# Patient Record
Sex: MALE | Race: WHITE | Employment: FULL TIME | URBAN - METROPOLITAN AREA
[De-identification: names, ages, dates, MRNs, and addresses within clinical notes are randomized per-mention and may not be internally consistent; named-entity substitution may affect disease eponyms.]

---

## 2017-01-13 ENCOUNTER — OFFICE VISIT (OUTPATIENT)
Dept: FAMILY MEDICINE CLINIC | Age: 52
End: 2017-01-13

## 2017-01-13 VITALS
RESPIRATION RATE: 18 BRPM | DIASTOLIC BLOOD PRESSURE: 81 MMHG | HEIGHT: 73 IN | WEIGHT: 300 LBS | BODY MASS INDEX: 39.76 KG/M2 | OXYGEN SATURATION: 96 % | HEART RATE: 88 BPM | SYSTOLIC BLOOD PRESSURE: 122 MMHG | TEMPERATURE: 98.2 F

## 2017-01-13 DIAGNOSIS — M50.90 CERVICAL DISC DISORDER: Primary | ICD-10-CM

## 2017-01-13 DIAGNOSIS — I10 ESSENTIAL HYPERTENSION WITH GOAL BLOOD PRESSURE LESS THAN 140/90: ICD-10-CM

## 2017-01-13 RX ORDER — METHOCARBAMOL 500 MG/1
500 TABLET, FILM COATED ORAL 3 TIMES DAILY
Qty: 90 TAB | Refills: 1 | Status: SHIPPED | OUTPATIENT
Start: 2017-01-13 | End: 2017-07-28

## 2017-01-13 RX ORDER — METHYLPREDNISOLONE 4 MG/1
TABLET ORAL
Qty: 1 DOSE PACK | Refills: 0 | Status: SHIPPED | OUTPATIENT
Start: 2017-01-13 | End: 2017-07-28

## 2017-01-13 RX ORDER — ATENOLOL 50 MG/1
TABLET ORAL
Qty: 90 TAB | Refills: 3 | Status: SHIPPED | OUTPATIENT
Start: 2017-01-13 | End: 2017-08-04 | Stop reason: SINTOL

## 2017-01-13 NOTE — MR AVS SNAPSHOT
Visit Information Date & Time Provider Department Dept. Phone Encounter #  
 1/13/2017  2:15 PM Cherise Martinez, [address] [facility] [phone] [id number] Upcoming Health Maintenance Date Due Pneumococcal 19-64 Highest Risk (1 of 3 - PCV13) 12/27/1983 DTaP/Tdap/Td series (1 - Tdap) 12/27/1985 COLONOSCOPY 2/23/2015 Allergies as of 1/13/2017  Review Complete On: 1/13/2017 By: Cherise Martinez MD  
 No Known Allergies Current Immunizations  Never Reviewed No immunizations on file. Not reviewed this visit You Were Diagnosed With   
  
 Codes Comments Low back strain, initial encounter     ICD-10-CM: S39.012A ICD-9-CM: 847.2 Encounter to establish care     ICD-10-CM: Z76.89 
ICD-9-CM: V65.8 Essential hypertension with goal blood pressure less than 140/90     ICD-10-CM: I10 
ICD-9-CM: 401.9 Vitals BP Pulse Temp Resp Height(growth percentile) Weight(growth percentile) 122/81 (BP 1 Location: Left arm, BP Patient Position: Sitting) 88 98.2 °F (36.8 °C) 18 6' 1\" (1.854 m) 300 lb (136.1 kg) SpO2 BMI Smoking Status 96% 39.58 kg/m2 Former Smoker BMI and BSA Data Body Mass Index Body Surface Area  
 39.58 kg/m 2 2.65 m 2 Preferred Pharmacy Pharmacy Name Phone [place] [phone] - [address], [address] AT [place] [address] [phone] Your Updated Medication List  
  
   
This list is accurate as of: 1/13/17  2:33 PM.  Always use your most recent med list.  
  
  
  
  
 allopurinol 300 mg tablet Commonly known as:  Jenn Bibber Take 1 Tab by mouth daily. Indications: GOUT  
  
 atenolol 50 mg tablet Commonly known as:  TENORMIN  
TAKE 1 TABLET BY MOUTH EVERY DAY  
  
 citalopram 40 mg tablet Commonly known as:  Kristie Sol Take 1 Tab by mouth daily. gabapentin 100 mg capsule Commonly known as:  NEURONTIN  
 TAKE 3 CAPSULES BY MOUTH THREE TIMES DAILY  
  
 ibuprofen 800 mg tablet Commonly known as:  MOTRIN Take 1 Tab by mouth every eight (8) hours as needed for Pain. lisinopril 20 mg tablet Commonly known as:  Kenn Bridges Take 1 Tab by mouth daily. methocarbamol 500 mg tablet Commonly known as:  ROBAXIN Take 1 Tab by mouth three (3) times daily. methylPREDNISolone 4 mg tablet Commonly known as:  Barbarann Punt As directed  
  
 omeprazole 20 mg capsule Commonly known as:  PRILOSEC Take 20 mg by mouth every morning. PROBIOTIC 4X 10-15 mg Tbec Generic drug:  B.infantis-B.ani-B.long-B.bifi Take 1 Tab by mouth daily. Prescriptions Sent to Pharmacy Refills  
 methocarbamol (ROBAXIN) 500 mg tablet 1 Sig: Take 1 Tab by mouth three (3) times daily. Class: Normal  
 Pharmacy: [facility] [address] Ph #: [phone] Route: Oral  
 methylPREDNISolone (MEDROL DOSEPACK) 4 mg tablet 0 Sig: As directed Class: Normal  
 Pharmacy: [facility] [address], [address] [place] AT [address] [place] [address] Ph #: [phone]  
 atenolol (TENORMIN) 50 mg tablet 3 Sig: TAKE 1 TABLET BY MOUTH EVERY DAY Class: Normal  
 Pharmacy: [facility] [address] Ph #: [phone] Patient Instructions Call Dr. Bella Camacho for follow up appointment to address your persistant neck pain Introducing [facility]! Dear Alexandria Xavier: 
Thank you for requesting a [employer] account. Our records indicate that you have previously registered for a [employer] account but its currently inactive. Please call our [employer] support line at [phone]. Additional Information If you have questions, please visit the Frequently Asked Questions section of the [employer] website at [email or web address] Remember, [employer] is NOT to be used for urgent needs. For medical emergencies, dial 911. Now available from your iPhone and Android! Please provide this summary of care documentation to your next provider. Your primary care clinician is listed as Spike Devine. If you have any questions after today's visit, please call [phone].

## 2017-01-13 NOTE — PROGRESS NOTES
52yo poor historian complains of fatigue, neck pain, pain in both arms and pain in right side. Wakes up feeling ok, gets progressively worse as day goes on especially if he exerts himself. No symptoms below the waist.  Has pain in neck, upper and middle back. Pain radiating to arms is all the time. 9/15 - cervical spine surgery with Dr. Margarita Beltran - had a couple of follow ups when he was recovering. Has not seen him since his symptoms recurred. 12/15 - knee surgery - Dr. Luz Saini Prescriptions on File Prior to Visit   Medication Sig Dispense Refill    methocarbamol (ROBAXIN) 500 mg tablet Take 1 Tab by mouth three (3) times daily. 90 Tab 1    gabapentin (NEURONTIN) 100 mg capsule TAKE 3 CAPSULES BY MOUTH THREE TIMES DAILY 270 Cap 0    ibuprofen (MOTRIN) 800 mg tablet Take 1 Tab by mouth every eight (8) hours as needed for Pain. 30 Tab 0    citalopram (CELEXA) 40 mg tablet Take 1 Tab by mouth daily. 90 Tab 3    lisinopril (PRINIVIL, ZESTRIL) 20 mg tablet Take 1 Tab by mouth daily. 90 Tab 3    allopurinol (ZYLOPRIM) 300 mg tablet Take 1 Tab by mouth daily. Indications: GOUT 90 Tab 3    atenolol (TENORMIN) 50 mg tablet TAKE 1 TABLET BY MOUTH EVERY DAY 90 Tab 3    B.infantis-B.ani-B.long-B.bifi (PROBIOTIC 4X) 10-15 mg TbEC Take 1 Tab by mouth daily.  omeprazole (PRILOSEC) 20 mg capsule Take 20 mg by mouth every morning. No current facility-administered medications on file prior to visit. No Known Allergies     ROS negative except as per HPI. Visit Vitals    /81 (BP 1 Location: Left arm, BP Patient Position: Sitting)    Pulse 88    Temp 98.2 °F (36.8 °C)    Resp 18    Ht 6' 1\" (1.854 m)    Wt 300 lb (136.1 kg)    SpO2 96%    BMI 39.58 kg/m2     Gen: alert, oriented, agitated and uncomfortable.  obese  Eyes: pupils equal round reactive to light, sclera clear, conjunctiva clear  Oral: moist mucus membranes, no oral lesions, no pharyngeal inflammation or exudate  Neck: large, decreased ROM  Resp: no increase work of breathing, lungs clear to auscuation bilaterally, no wheezing, rales or rhonchi  CV: S1, S2 normal. No murmurs, rubs, or gallops. Abd: soft, not tender, not distended. Normal bowel sounds. Neuro: cranial nerves intact, no focal strength deficits noted  Extremities: no cyanosis or edema    Assessment/Plan:      ICD-10-CM ICD-9-CM    1. Cervical disc disorder - after reviewing history, convinced patient to return to ortho who did cervical fusion last year as his biggest complaints seem to revolve around symptoms that are radicular cervical symptoms. In the meantime medrol and robaxin. Explained there is no role for long term opiates. M50.90 722.91 methocarbamol (ROBAXIN) 500 mg tablet   2. Essential hypertension with goal blood pressure less than 140/90 - At goal.  Continue current medications. I10 401.9 atenolol (TENORMIN) 50 mg tablet           Medications Discontinued During This Encounter   Medication Reason    methocarbamol (ROBAXIN) 500 mg tablet Reorder    atenolol (TENORMIN) 50 mg tablet Reorder       Current Outpatient Prescriptions   Medication Sig Dispense Refill    methocarbamol (ROBAXIN) 500 mg tablet Take 1 Tab by mouth three (3) times daily. 90 Tab 1    methylPREDNISolone (MEDROL DOSEPACK) 4 mg tablet As directed 1 Dose Pack 0    atenolol (TENORMIN) 50 mg tablet TAKE 1 TABLET BY MOUTH EVERY DAY 90 Tab 3    gabapentin (NEURONTIN) 100 mg capsule TAKE 3 CAPSULES BY MOUTH THREE TIMES DAILY 270 Cap 0    ibuprofen (MOTRIN) 800 mg tablet Take 1 Tab by mouth every eight (8) hours as needed for Pain. 30 Tab 0    citalopram (CELEXA) 40 mg tablet Take 1 Tab by mouth daily. 90 Tab 3    lisinopril (PRINIVIL, ZESTRIL) 20 mg tablet Take 1 Tab by mouth daily. 90 Tab 3    allopurinol (ZYLOPRIM) 300 mg tablet Take 1 Tab by mouth daily.  Indications: GOUT 90 Tab 3    B.infantis-B.ani-B.long-B.bifi (PROBIOTIC 4X) 10-15 mg TbEC Take 1 Tab by mouth daily.  omeprazole (PRILOSEC) 20 mg capsule Take 20 mg by mouth every morning. Recommended healthy diet low in carbohydrates, fats, sodium and cholesterol. Recommended regular cardiovascular exercise 3-6 times per week for 30-60 minutes daily. Verbal and written instructions (see AVS) provided. Patient expresses understanding of diagnosis and treatment plan.

## 2017-02-21 DIAGNOSIS — Z76.89 ENCOUNTER TO ESTABLISH CARE: ICD-10-CM

## 2017-02-21 DIAGNOSIS — F41.8 ANXIETY ASSOCIATED WITH DEPRESSION: ICD-10-CM

## 2017-02-23 RX ORDER — CITALOPRAM 40 MG/1
40 TABLET, FILM COATED ORAL DAILY
Qty: 90 TAB | Refills: 3 | Status: SHIPPED | OUTPATIENT
Start: 2017-02-23 | End: 2017-08-18 | Stop reason: SDUPTHER

## 2017-04-02 ENCOUNTER — HOSPITAL ENCOUNTER (EMERGENCY)
Age: 52
Discharge: HOME | End: 2017-04-02
Attending: EMERGENCY MEDICINE
Payer: COMMERCIAL

## 2017-04-02 ENCOUNTER — APPOINTMENT (OUTPATIENT)
Dept: GENERAL RADIOLOGY | Age: 52
End: 2017-04-02
Attending: EMERGENCY MEDICINE
Payer: COMMERCIAL

## 2017-04-02 VITALS
DIASTOLIC BLOOD PRESSURE: 67 MMHG | OXYGEN SATURATION: 98 % | RESPIRATION RATE: 18 BRPM | HEART RATE: 64 BPM | WEIGHT: 298.94 LBS | SYSTOLIC BLOOD PRESSURE: 153 MMHG | TEMPERATURE: 97.9 F | BODY MASS INDEX: 39.62 KG/M2 | HEIGHT: 73 IN

## 2017-04-02 DIAGNOSIS — S83.91XA SPRAIN OF RIGHT KNEE, UNSPECIFIED LIGAMENT, INITIAL ENCOUNTER: Primary | ICD-10-CM

## 2017-04-02 PROCEDURE — 73562 X-RAY EXAM OF KNEE 3: CPT

## 2017-04-02 PROCEDURE — 99282 EMERGENCY DEPT VISIT SF MDM: CPT

## 2017-04-02 RX ORDER — LIDOCAINE HYDROCHLORIDE 10 MG/ML
5 INJECTION, SOLUTION EPIDURAL; INFILTRATION; INTRACAUDAL; PERINEURAL ONCE
Status: DISCONTINUED | OUTPATIENT
Start: 2017-04-02 | End: 2017-04-02

## 2017-04-02 RX ORDER — TRIAMCINOLONE ACETONIDE 40 MG/ML
40 INJECTION, SUSPENSION INTRA-ARTICULAR; INTRAMUSCULAR
Status: DISCONTINUED | OUTPATIENT
Start: 2017-04-02 | End: 2017-04-02

## 2017-04-02 RX ORDER — OXYCODONE AND ACETAMINOPHEN 5; 325 MG/1; MG/1
1-2 TABLET ORAL
Qty: 20 TAB | Refills: 0 | Status: SHIPPED | OUTPATIENT
Start: 2017-04-02 | End: 2017-07-28

## 2017-04-02 NOTE — ED PROVIDER NOTES
HPI Comments: Emily Mcduffie, 45 y.o. Male with PMHx of HTN, gout, GERD, asthma, and arthritis presents ambulatory to the ED with cc of right knee pain after misstepping while exiting a camper yesterday. Pt also reports a lump on the outside of his right knee that he has had intermittently since a right knee arthroscopy and cyst removal in 12/2015. Pt notes that the current pain is different from previous knee pain, prompting his visit today. He is requesting a steroid injection for his knee and does not know if he has had one in his right knee in the past. He has been able to ambulate independently since the fall without difficulty. Pt's wife is a podiatrist and she has drained the lump multiple times since the surgery. Pt notes that he was an  for 20+ years. He denies any fevers, chills, N/V/D, CP, SOB, dysuria or frequency. PCP: Lokesh Beasley MD    Social history significant for: - Tobacco (former), - EtOH, - Illicit Drug Use  PSHx: right knee arthroscopy and cyst removal, C4-C7 fusion    There are no other complaints, changes, or physical findings at this time. Written by ALBA Mcintyre, as dictated by Anatoliy Jim DO. The history is provided by the patient. No  was used.         Past Medical History:   Diagnosis Date    Anxiety     Arthritis     back /gout    Asthma     Pt denies any sx currently 12/2/15    Chronic pain     Rt knee; has had cortisone shots    Diverticulitis     diverticulosis, pancreatitis    GERD (gastroesophageal reflux disease)     Gout     Right and left toes:  on preventative Rx so denies any problems    History of kidney stones     x1    Hypertension     MRSA (methicillin resistant staph aureus) culture positive 9/8/15    nares, treated with ABX    Psychiatric disorder     anxiety       Past Surgical History:   Procedure Laterality Date    HX CERVICAL FUSION  9/22/15    C4-7    HX COLONOSCOPY      HX ORTHOPAEDIC right knee arthroscopy and cyst removed         Family History:   Problem Relation Age of Onset    Cancer Mother      CA COLON    Hypertension Mother     Hypertension Father     Kidney Disease Father        Social History     Social History    Marital status: SINGLE     Spouse name: N/A    Number of children: 1    Years of education: N/A     Occupational History    unemployed      Social History Main Topics    Smoking status: Former Smoker     Packs/day: 0.50     Years: 32.00     Quit date: 11/26/2009    Smokeless tobacco: Never Used      Comment: Quit 2 yrs ago    Alcohol use No    Drug use: No    Sexual activity: Yes     Partners: Female     Other Topics Concern    Not on file     Social History Narrative         ALLERGIES: Review of patient's allergies indicates no known allergies. Review of Systems   Constitutional: Negative. Negative for appetite change, chills, fatigue and fever. HENT: Negative. Negative for congestion, rhinorrhea, sinus pressure and sore throat. Eyes: Negative. Respiratory: Negative. Negative for cough, choking, chest tightness, shortness of breath and wheezing. Cardiovascular: Negative. Negative for chest pain, palpitations and leg swelling. Gastrointestinal: Negative for abdominal pain, constipation, diarrhea, nausea and vomiting. Endocrine: Negative. Genitourinary: Negative. Negative for difficulty urinating, dysuria, flank pain and urgency. Musculoskeletal: Positive for myalgias (R knee). Skin:        (+) lump to outer R knee   Neurological: Negative. Negative for dizziness, speech difficulty, weakness, light-headedness, numbness and headaches. Psychiatric/Behavioral: Negative. All other systems reviewed and are negative.       Vitals:    04/02/17 1317   BP: 153/67   Pulse: 64   Resp: 18   Temp: 97.9 °F (36.6 °C)   SpO2: 98%   Weight: 135.6 kg (298 lb 15.1 oz)   Height: 6' 1\" (1.854 m)            Physical Exam   Constitutional: He is oriented to person, place, and time. He appears well-developed and well-nourished. No distress. HENT:   Head: Normocephalic and atraumatic. Mouth/Throat: Oropharynx is clear and moist. No oropharyngeal exudate. Eyes: Conjunctivae and EOM are normal. Pupils are equal, round, and reactive to light. Neck: Normal range of motion. Neck supple. No JVD present. No tracheal deviation present. Cardiovascular: Normal rate, regular rhythm, normal heart sounds and intact distal pulses. No murmur heard. Pulmonary/Chest: Effort normal and breath sounds normal. No stridor. No respiratory distress. He has no wheezes. He has no rales. He exhibits no tenderness. Musculoskeletal: Normal range of motion. Right knee- surgical scars, mild swelling noted, no erythema or warmth, tenderness over the inferior pole, ligaments intact, Michaelle Delarosa,      Neurological: He is alert and oriented to person, place, and time. No cranial nerve deficit. No gross motor or sensory deficits    Skin: Skin is warm and dry. He is not diaphoretic. Psychiatric: He has a normal mood and affect. His behavior is normal.   Nursing note and vitals reviewed. MDM  Number of Diagnoses or Management Options  Sprain of right knee, unspecified ligament, initial encounter:   Diagnosis management comments: DDx: knee sprain, fracture, arthritis       Amount and/or Complexity of Data Reviewed  Tests in the radiology section of CPT®: ordered and reviewed  Review and summarize past medical records: yes    Patient Progress  Patient progress: stable    ED Course       Procedures    2:45 PM  XR is normal, will inform pt of results.   Written by Ryann Ariza ED Scribe, as dictated by Derick Cook DO.    3:04 PM  Pt is now declining steroid injection and will request one when he follows up with orthopedic specialist.   Written by Ryann Ariza ED Scribe, as dictated by Derick Cook DO         IMAGING RESULTS:  XR KNEE RT 3 V   Final Result EXAM: XR KNEE RT 3 V     INDICATION: Right knee pain after patient misstepped exiting a camper yesterday.     COMPARISON: 11/11/2015.     FINDINGS: Three views of the right knee demonstrate no fracture. There is  chondrocalcinosis without erosive change. Osteophytosis is noted in the lateral  compartment. A significant joint effusion is not seen. There is stable  dystrophic calcification in the distal quadriceps tendon.     IMPRESSION  IMPRESSION: Chondrocalcinosis without evidence for CPPD arthropathy. No acute  fracture. IMPRESSION:  1. Sprain of right knee, unspecified ligament, initial encounter        PLAN:  1. Current Discharge Medication List      START taking these medications    Details   oxyCODONE-acetaminophen (PERCOCET) 5-325 mg per tablet Take 1-2 Tabs by mouth every four (4) hours as needed for Pain. Max Daily Amount: 12 Tabs. Qty: 20 Tab, Refills: 0           2. Follow-up Information     Follow up With Details Comments [address] [place] [address] MD Pooja  FOLLOW UP WITH [place] [address] [address] [place] [address], [address] [place] [address]  [phone]          Return to ED if worse     Discharge Note:  3:06 PM  The pt is ready for discharge. The pt's signs, symptoms, diagnosis, and discharge instructions have been discussed and pt has conveyed their understanding. The pt is to follow up as recommended or return to ER should their symptoms worsen. Plan has been discussed and pt is in agreement. This note is prepared by Ariadne Rios, acting as a Scribe for Crescencio Balderrama, [address], DO: The scribe's documentation has been prepared under my direction and personally reviewed by me in its entirety. I confirm that the notes above accurately reflects all work, treatment, procedures, and medical decision making performed by me.

## 2017-04-02 NOTE — DISCHARGE INSTRUCTIONS
Knee Sprain: Care Instructions  Your Care Instructions    A knee sprain is one or more stretched, partly torn, or completely torn knee ligaments. Ligaments are bands of ropelike tissue that connect bone to bone and make the knee stable. The knee has four main ligaments. Knee sprains often happen because of a twisting or bending injury from sports such as skiing, basketball, soccer, or football. The knee turns one way while the lower or upper leg goes another way. A sprain also can happen when the knee is hit from the side or the front. If a knee ligament is slightly stretched, you will probably need only home treatment. You may need a splint or brace (immobilizer) for a partly torn ligament. A complete tear may need surgery. A minor knee sprain may take up to 6 weeks to heal, while a severe sprain may take months. Follow-up care is a key part of your treatment and safety. Be sure to make and go to all appointments, and call your doctor if you are having problems. It's also a good idea to know your test results and keep a list of the medicines you take. How can you care for yourself at home? · Follow instructions about how much weight you can put on your leg and how to walk with crutches. · Prop up your leg on a pillow when you ice it or anytime you sit or lie down for the next 3 days. Try to keep it above the level of your heart. This will help reduce swelling. · Put ice or a cold pack on your knee for 10 to 20 minutes at a time. Try to do this every 1 to 2 hours for the next 3 days (when you are awake) or until the swelling goes down. Put a thin cloth between the ice and your skin. Do not get the splint wet. · If you have an elastic bandage, make sure it is snug but not so tight that your leg is numb, tingles, or swells below the bandage. You can loosen the bandage if it is too tight. · Your doctor may recommend a brace (immobilizer) to support your knee while it heals. Wear it as directed.   · Ask your doctor if you can take an over-the-counter pain medicine, such as acetaminophen (Tylenol), ibuprofen (Advil, Motrin), or naproxen (Aleve). Be safe with medicines. Read and follow all instructions on the label. When should you call for help? Call 911 anytime you think you may need emergency care. For example, call if:  · You have sudden chest pain and shortness of breath, or you cough up blood. Call your doctor now or seek immediate medical care if:  · You have increased or severe pain. · You cannot move your toes or ankle. · Your foot is cool or pale or changes color. · You have tingling, weakness, or numbness in your foot or leg. · Your splint or brace feels too tight. · You are unable to straighten the knee, or the knee \"locks. \"  · You have signs of a blood clot in your leg, such as:  ¨ Pain in your calf, back of the knee, thigh, or groin. ¨ Redness and swelling in your leg. Watch closely for changes in your health, and be sure to contact your doctor if:  · Your pain is not getting better or is getting worse. Where can you learn more? Go to http://cary-bhumika.info/. Enter N406 in the search box to learn more about \"Knee Sprain: Care Instructions. \"  Current as of: May 23, 2016  Content Version: 11.2  © 2994-3598 [employer]. Care instructions adapted under license by [employer] (which disclaims liability or warranty for this information). If you have questions about a medical condition or this instruction, always ask your healthcare professional. [place] [address] any warranty or liability for your use of this information. Learning About RICE (Rest, Ice, Compression, and Elevation)  What is RICE? RICE is a way to care for an injury. RICE helps relieve pain and swelling. It may also help with healing and flexibility. RICE stands for:  · Rest and protect the injured or sore area.   · Ice or a cold pack used as soon as possible. · Compression, or wrapping the injured or sore area with an elastic bandage. · Elevation (propping up) the injured or sore area. How do you do RICE? You can use RICE for home treatment when you have general aches and pains or after an injury or surgery. Rest  · Do not put weight on the injury for at least 24 to 48 hours. · Use crutches for a badly sprained knee or ankle. · Support a sprained wrist, elbow, or shoulder with a sling. Ice  · Put ice or a cold pack on the injury right away to reduce pain and swelling. Frozen vegetables will also work as an ice pack. Put a thin cloth between the ice or cold pack and your skin. The cloth protects the injured area from getting too cold. · Use ice for 10 to 15 minutes at a time for the first 48 to 72 hours. Compression  · Use compression for sprains, strains, and surgeries of the arms and legs. · Wrap the injured area with an elastic bandage or compression sleeve to reduce swelling. · Don't wrap it too tightly. If the area below it feels numb, tingles, or feels cool, loosen the wrap. Elevation  · Use elevation for areas of the body that can be propped up, such as arms and legs. · Prop up the injured area on pillows whenever you use ice. Keep it propped up anytime you sit or lie down. · Try to keep the injured area at or above the level of your heart. This will help reduce swelling and bruising. Where can you learn more? Go to http://cary-bhumika.info/. Enter Q908 in the search box to learn more about \"Learning About RICE (Rest, Ice, Compression, and Elevation). \"  Current as of: May 23, 2016  Content Version: 11.2  © 5939-4412 [employer]. Care instructions adapted under license by [employer] (which disclaims liability or warranty for this information).  If you have questions about a medical condition or this instruction, always ask your healthcare professional. Du Moore any warranty or liability for your use of this information.

## 2017-05-20 DIAGNOSIS — F41.8 ANXIETY ASSOCIATED WITH DEPRESSION: ICD-10-CM

## 2017-05-20 DIAGNOSIS — Z76.89 ENCOUNTER TO ESTABLISH CARE: ICD-10-CM

## 2017-05-23 RX ORDER — CITALOPRAM 40 MG/1
TABLET, FILM COATED ORAL
Qty: 90 TAB | Refills: 0 | Status: SHIPPED | OUTPATIENT
Start: 2017-05-23 | End: 2017-08-04 | Stop reason: SDUPTHER

## 2017-06-16 DIAGNOSIS — Z76.89 ENCOUNTER TO ESTABLISH CARE: ICD-10-CM

## 2017-06-16 DIAGNOSIS — I10 ESSENTIAL HYPERTENSION WITH GOAL BLOOD PRESSURE LESS THAN 140/90: ICD-10-CM

## 2017-06-16 DIAGNOSIS — M1A.9XX0 CHRONIC GOUT INVOLVING TOE WITHOUT TOPHUS, UNSPECIFIED CAUSE, UNSPECIFIED LATERALITY: ICD-10-CM

## 2017-06-16 RX ORDER — ALLOPURINOL 300 MG/1
TABLET ORAL
Qty: 90 TAB | Refills: 0 | Status: SHIPPED | OUTPATIENT
Start: 2017-06-16 | End: 2017-08-04 | Stop reason: SDUPTHER

## 2017-06-16 RX ORDER — LISINOPRIL 20 MG/1
TABLET ORAL
Qty: 90 TAB | Refills: 0 | Status: SHIPPED | OUTPATIENT
Start: 2017-06-16 | End: 2017-08-04 | Stop reason: SDUPTHER

## 2017-07-28 ENCOUNTER — HOSPITAL ENCOUNTER (EMERGENCY)
Age: 52
Discharge: HOME | End: 2017-07-29
Attending: EMERGENCY MEDICINE | Admitting: EMERGENCY MEDICINE
Payer: COMMERCIAL

## 2017-07-28 ENCOUNTER — APPOINTMENT (OUTPATIENT)
Dept: CT IMAGING | Age: 52
End: 2017-07-28
Attending: EMERGENCY MEDICINE
Payer: COMMERCIAL

## 2017-07-28 VITALS
HEIGHT: 73 IN | SYSTOLIC BLOOD PRESSURE: 142 MMHG | RESPIRATION RATE: 21 BRPM | DIASTOLIC BLOOD PRESSURE: 86 MMHG | OXYGEN SATURATION: 94 % | WEIGHT: 294.09 LBS | TEMPERATURE: 98 F | HEART RATE: 56 BPM | BODY MASS INDEX: 38.98 KG/M2

## 2017-07-28 DIAGNOSIS — S16.1XXA CERVICAL STRAIN, INITIAL ENCOUNTER: Primary | ICD-10-CM

## 2017-07-28 LAB
ALBUMIN SERPL BCP-MCNC: 3.5 G/DL (ref 3.5–5)
ALBUMIN/GLOB SERPL: 0.8 {RATIO} (ref 1.1–2.2)
ALP SERPL-CCNC: 80 U/L (ref 45–117)
ALT SERPL-CCNC: 30 U/L (ref 12–78)
ANION GAP BLD CALC-SCNC: 6 MMOL/L (ref 5–15)
APPEARANCE UR: CLEAR
AST SERPL W P-5'-P-CCNC: 22 U/L (ref 15–37)
BACTERIA URNS QL MICRO: NEGATIVE /HPF
BASOPHILS # BLD AUTO: 0.1 K/UL (ref 0–0.1)
BASOPHILS # BLD: 0 % (ref 0–1)
BILIRUB SERPL-MCNC: 0.2 MG/DL (ref 0.2–1)
BILIRUB UR QL: NEGATIVE
BUN SERPL-MCNC: 20 MG/DL (ref 6–20)
BUN/CREAT SERPL: 20 (ref 12–20)
CALCIUM SERPL-MCNC: 9.1 MG/DL (ref 8.5–10.1)
CHLORIDE SERPL-SCNC: 103 MMOL/L (ref 97–108)
CK MB CFR SERPL CALC: 1.2 % (ref 0–2.5)
CK MB SERPL-MCNC: 3.2 NG/ML (ref 5–25)
CK SERPL-CCNC: 269 U/L (ref 39–308)
CO2 SERPL-SCNC: 28 MMOL/L (ref 21–32)
COLOR UR: NORMAL
CREAT SERPL-MCNC: 0.99 MG/DL (ref 0.7–1.3)
EOSINOPHIL # BLD: 0.3 K/UL (ref 0–0.4)
EOSINOPHIL NFR BLD: 2 % (ref 0–7)
EPITH CASTS URNS QL MICRO: NORMAL /LPF
ERYTHROCYTE [DISTWIDTH] IN BLOOD BY AUTOMATED COUNT: 14.3 % (ref 11.5–14.5)
GLOBULIN SER CALC-MCNC: 4.2 G/DL (ref 2–4)
GLUCOSE SERPL-MCNC: 134 MG/DL (ref 65–100)
GLUCOSE UR STRIP.AUTO-MCNC: NEGATIVE MG/DL
HCT VFR BLD AUTO: 42.2 % (ref 36.6–50.3)
HGB BLD-MCNC: 14.4 G/DL (ref 12.1–17)
HGB UR QL STRIP: NEGATIVE
HYALINE CASTS URNS QL MICRO: NORMAL /LPF (ref 0–5)
KETONES UR QL STRIP.AUTO: NEGATIVE MG/DL
LEUKOCYTE ESTERASE UR QL STRIP.AUTO: NEGATIVE
LYMPHOCYTES # BLD AUTO: 26 % (ref 12–49)
LYMPHOCYTES # BLD: 4.2 K/UL (ref 0.8–3.5)
MCH RBC QN AUTO: 31.9 PG (ref 26–34)
MCHC RBC AUTO-ENTMCNC: 34.1 G/DL (ref 30–36.5)
MCV RBC AUTO: 93.6 FL (ref 80–99)
MONOCYTES # BLD: 1.5 K/UL (ref 0–1)
MONOCYTES NFR BLD AUTO: 9 % (ref 5–13)
NEUTS SEG # BLD: 10 K/UL (ref 1.8–8)
NEUTS SEG NFR BLD AUTO: 63 % (ref 32–75)
NITRITE UR QL STRIP.AUTO: NEGATIVE
PH UR STRIP: 5.5 [PH] (ref 5–8)
PLATELET # BLD AUTO: 336 K/UL (ref 150–400)
POTASSIUM SERPL-SCNC: 3.8 MMOL/L (ref 3.5–5.1)
PROT SERPL-MCNC: 7.7 G/DL (ref 6.4–8.2)
PROT UR STRIP-MCNC: NEGATIVE MG/DL
RBC # BLD AUTO: 4.51 M/UL (ref 4.1–5.7)
RBC #/AREA URNS HPF: NORMAL /HPF (ref 0–5)
SODIUM SERPL-SCNC: 137 MMOL/L (ref 136–145)
SP GR UR REFRACTOMETRY: 1.02 (ref 1–1.03)
TROPONIN I SERPL-MCNC: <0.04 NG/ML
UA: UC IF INDICATED,UAUC: NORMAL
UROBILINOGEN UR QL STRIP.AUTO: 0.2 EU/DL (ref 0.2–1)
WBC # BLD AUTO: 16 K/UL (ref 4.1–11.1)
WBC URNS QL MICRO: NORMAL /HPF (ref 0–4)

## 2017-07-28 PROCEDURE — 81001 URINALYSIS AUTO W/SCOPE: CPT | Performed by: EMERGENCY MEDICINE

## 2017-07-28 PROCEDURE — 84484 ASSAY OF TROPONIN QUANT: CPT | Performed by: EMERGENCY MEDICINE

## 2017-07-28 PROCEDURE — 85025 COMPLETE CBC W/AUTO DIFF WBC: CPT | Performed by: EMERGENCY MEDICINE

## 2017-07-28 PROCEDURE — 96374 THER/PROPH/DIAG INJ IV PUSH: CPT

## 2017-07-28 PROCEDURE — 82550 ASSAY OF CK (CPK): CPT | Performed by: EMERGENCY MEDICINE

## 2017-07-28 PROCEDURE — 80053 COMPREHEN METABOLIC PANEL: CPT | Performed by: EMERGENCY MEDICINE

## 2017-07-28 PROCEDURE — 36415 COLL VENOUS BLD VENIPUNCTURE: CPT | Performed by: EMERGENCY MEDICINE

## 2017-07-28 PROCEDURE — 74011250637 HC RX REV CODE- 250/637: Performed by: EMERGENCY MEDICINE

## 2017-07-28 PROCEDURE — 74011250636 HC RX REV CODE- 250/636: Performed by: EMERGENCY MEDICINE

## 2017-07-28 PROCEDURE — 99285 EMERGENCY DEPT VISIT HI MDM: CPT

## 2017-07-28 PROCEDURE — 93005 ELECTROCARDIOGRAM TRACING: CPT

## 2017-07-28 PROCEDURE — 72125 CT NECK SPINE W/O DYE: CPT

## 2017-07-28 RX ORDER — KETOROLAC TROMETHAMINE 30 MG/ML
15 INJECTION, SOLUTION INTRAMUSCULAR; INTRAVENOUS
Status: COMPLETED | OUTPATIENT
Start: 2017-07-28 | End: 2017-07-28

## 2017-07-28 RX ORDER — OXYCODONE AND ACETAMINOPHEN 5; 325 MG/1; MG/1
2 TABLET ORAL
Status: COMPLETED | OUTPATIENT
Start: 2017-07-28 | End: 2017-07-28

## 2017-07-28 RX ORDER — CYCLOBENZAPRINE HCL 5 MG
5 TABLET ORAL
Qty: 15 TAB | Refills: 0 | Status: SHIPPED | OUTPATIENT
Start: 2017-07-28 | End: 2019-01-26

## 2017-07-28 RX ORDER — TRAMADOL HYDROCHLORIDE 50 MG/1
50 TABLET ORAL
Qty: 20 TAB | Refills: 0 | Status: SHIPPED | OUTPATIENT
Start: 2017-07-28 | End: 2017-08-07

## 2017-07-28 RX ADMIN — KETOROLAC TROMETHAMINE 15 MG: 30 INJECTION, SOLUTION INTRAMUSCULAR at 22:47

## 2017-07-28 RX ADMIN — OXYCODONE HYDROCHLORIDE AND ACETAMINOPHEN 2 TABLET: 5; 325 TABLET ORAL at 22:48

## 2017-07-29 LAB
ATRIAL RATE: 55 BPM
CALCULATED P AXIS, ECG09: -2 DEGREES
CALCULATED R AXIS, ECG10: 33 DEGREES
CALCULATED T AXIS, ECG11: 53 DEGREES
DIAGNOSIS, 93000: NORMAL
P-R INTERVAL, ECG05: 148 MS
Q-T INTERVAL, ECG07: 438 MS
QRS DURATION, ECG06: 90 MS
QTC CALCULATION (BEZET), ECG08: 419 MS
VENTRICULAR RATE, ECG03: 55 BPM
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
## 2017-07-29 NOTE — DISCHARGE INSTRUCTIONS
Neck Strain: Care Instructions  Your Care Instructions  You have strained the muscles and ligaments in your neck. A sudden, awkward movement can strain the neck. This often occurs with falls or car accidents or during certain sports. Everyday activities like working on a computer or sleeping can also cause neck strain if they force you to hold your neck in an awkward position for a long time. It is common for neck pain to get worse for a day or two after an injury, but it should start to feel better after that. You may have more pain and stiffness for several days before it gets better. This is expected. It may take a few weeks or longer for it to heal completely. Good home treatment can help you get better faster and avoid future neck problems. Follow-up care is a key part of your treatment and safety. Be sure to make and go to all appointments, and call your doctor if you are having problems. It's also a good idea to know your test results and keep a list of the medicines you take. How can you care for yourself at home? · If you were given a neck brace (cervical collar) to limit neck motion, wear it as instructed for as many days as your doctor tells you to. Do not wear it longer than you were told to. Wearing a brace for too long can make neck stiffness worse and weaken the neck muscles. · You can try using heat or ice to see if it helps. ¨ Try using a heating pad on a low or medium setting for 15 to 20 minutes every 2 to 3 hours. Try a warm shower in place of one session with the heating pad. You can also buy single-use heat wraps that last up to 8 hours. ¨ You can also try an ice pack for 10 to 15 minutes every 2 to 3 hours. · Take pain medicines exactly as directed. ¨ If the doctor gave you a prescription medicine for pain, take it as prescribed. ¨ If you are not taking a prescription pain medicine, ask your doctor if you can take an over-the-counter medicine.   · Gently rub the area to relieve pain and help with blood flow. Do not massage the area if it hurts to do so. · Do not do anything that makes the pain worse. Take it easy for a couple of days. You can do your usual activities if they do not hurt your neck or put it at risk for more stress or injury. · Try sleeping on a special neck pillow. Place it under your neck, not under your head. Placing a tightly rolled-up towel under your neck while you sleep will also work. If you use a neck pillow or rolled towel, do not use your regular pillow at the same time. · To prevent future neck pain, do exercises to stretch and strengthen your neck and back. Learn how to use good posture, safe lifting techniques, and proper body mechanics. When should you call for help? Call 911 anytime you think you may need emergency care. For example, call if:  · You are unable to move an arm or a leg at all. Call your doctor now or seek immediate medical care if:  · You have new or worse symptoms in your arms, legs, chest, belly, or buttocks. Symptoms may include:  ¨ Numbness or tingling. ¨ Weakness. ¨ Pain. · You lose bladder or bowel control. Watch closely for changes in your health, and be sure to contact your doctor if:  · You are not getting better as expected. Where can you learn more? Go to http://cary-bhumika.info/. Enter M253 in the search box to learn more about \"Neck Strain: Care Instructions. \"  Current as of: March 21, 2017  Content Version: 11.3  © 3479-8093 [employer]. Care instructions adapted under license by [employer] (which disclaims liability or warranty for this information). If you have questions about a medical condition or this instruction, always ask your healthcare professional. [address] any warranty or liability for your use of this information.
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
## 2017-07-29 NOTE — ED NOTES
Patient ambulatory to ED room at this time with steady gait. Patient changing into ED gown at this time.

## 2017-07-29 NOTE — ED NOTES
Discharge instructions reviewed with pt and copy given along with RX by ER MD Byron Chun. Pt ambulatory from ED accompanied by daughter providing transportation.

## 2017-08-04 ENCOUNTER — OFFICE VISIT (OUTPATIENT)
Dept: INTERNAL MEDICINE CLINIC | Age: 52
End: 2017-08-04

## 2017-08-04 VITALS
WEIGHT: 292 LBS | TEMPERATURE: 97.8 F | OXYGEN SATURATION: 96 % | DIASTOLIC BLOOD PRESSURE: 91 MMHG | BODY MASS INDEX: 38.7 KG/M2 | RESPIRATION RATE: 18 BRPM | HEART RATE: 56 BPM | HEIGHT: 73 IN | SYSTOLIC BLOOD PRESSURE: 155 MMHG

## 2017-08-04 DIAGNOSIS — I10 ESSENTIAL HYPERTENSION WITH GOAL BLOOD PRESSURE LESS THAN 140/90: ICD-10-CM

## 2017-08-04 DIAGNOSIS — M50.90 CERVICAL DISC DISORDER: ICD-10-CM

## 2017-08-04 DIAGNOSIS — R53.83 FATIGUE, UNSPECIFIED TYPE: Primary | ICD-10-CM

## 2017-08-04 DIAGNOSIS — E66.9 OBESITY (BMI 30-39.9): Chronic | ICD-10-CM

## 2017-08-04 DIAGNOSIS — M1A.9XX0 CHRONIC GOUT INVOLVING TOE WITHOUT TOPHUS, UNSPECIFIED CAUSE, UNSPECIFIED LATERALITY: ICD-10-CM

## 2017-08-04 RX ORDER — LISINOPRIL 20 MG/1
TABLET ORAL
Qty: 90 TAB | Refills: 1 | Status: SHIPPED | OUTPATIENT
Start: 2017-08-04 | End: 2018-05-25 | Stop reason: SDUPTHER

## 2017-08-04 RX ORDER — OMEPRAZOLE/SODIUM BICARBONATE 20MG-1.1G
1 CAPSULE ORAL DAILY
COMMUNITY

## 2017-08-04 RX ORDER — ALLOPURINOL 300 MG/1
TABLET ORAL
Qty: 90 TAB | Refills: 1 | Status: SHIPPED | OUTPATIENT
Start: 2017-08-04 | End: 2019-11-11 | Stop reason: SDUPTHER

## 2017-08-04 RX ORDER — CHLORTHALIDONE 25 MG/1
25 TABLET ORAL DAILY
Qty: 90 TAB | Refills: 3 | Status: SHIPPED | OUTPATIENT
Start: 2017-08-04 | End: 2019-01-26

## 2017-08-04 RX ORDER — GABAPENTIN 100 MG/1
CAPSULE ORAL
Qty: 270 CAP | Refills: 1 | Status: SHIPPED | OUTPATIENT
Start: 2017-08-04 | End: 2018-04-26 | Stop reason: SDUPTHER

## 2017-08-04 NOTE — PATIENT INSTRUCTIONS
Fatigue: Care Instructions  Your Care Instructions  Fatigue is a feeling of tiredness, exhaustion, or lack of energy. You may feel fatigue because of too much or not enough activity. It can also come from stress, lack of sleep, boredom, and poor diet. Many medical problems, such as viral infections, can cause fatigue. Emotional problems, especially depression, are often the cause of fatigue. Fatigue is most often a symptom of another problem. Treatment for fatigue depends on the cause. For example, if you have fatigue because you have a certain health problem, treating this problem also treats your fatigue. If depression or anxiety is the cause, treatment may help. Follow-up care is a key part of your treatment and safety. Be sure to make and go to all appointments, and call your doctor if you are having problems. It's also a good idea to know your test results and keep a list of the medicines you take. How can you care for yourself at home? · Get regular exercise. But don't overdo it. Go back and forth between rest and exercise. · Get plenty of rest.  · Eat a healthy diet. Do not skip meals, especially breakfast.  · Reduce your use of caffeine, tobacco, and alcohol. Caffeine is most often found in coffee, tea, cola drinks, and chocolate. · Limit medicines that can cause fatigue. This includes tranquilizers and cold and allergy medicines. When should you call for help? Watch closely for changes in your health, and be sure to contact your doctor if:  · You have new symptoms such as fever or a rash. · Your fatigue gets worse. · You have been feeling down, depressed, or hopeless. Or you may have lost interest in things that you usually enjoy. · You are not getting better as expected. Where can you learn more? Go to http://cary-bhumika.info/. Enter T311 in the search box to learn more about \"Fatigue: Care Instructions. \"  Current as of: March 20, 2017  Content Version: 11.3  © 5899-2911 [employer]. Care instructions adapted under license by [employer] (which disclaims liability or warranty for this information). If you have questions about a medical condition or this instruction, always ask your healthcare professional. [place] [address] any warranty or liability for your use of this information. Sleep Apnea: Care Instructions  Your Care Instructions  Sleep apnea means that you frequently stop breathing for 10 seconds or longer during sleep. It can be mild to severe, based on the number of times an hour that you stop breathing or have slowed breathing. Blocked or narrowed airways in your nose, mouth, or throat can cause sleep apnea. Your airway can become blocked when your throat muscles and tongue relax during sleep. You can treat sleep apnea at home by making lifestyle changes. You also can use a CPAP breathing machine that keeps tissues in the throat from blocking your airway. Or your doctor may suggest that you use a breathing device while you sleep. It helps keep your airway open. This could be a device that you put in your mouth. Other examples include strips or disks that you use on your nose. In some cases, surgery may be needed to remove enlarged tissues in the throat. Follow-up care is a key part of your treatment and safety. Be sure to make and go to all appointments, and call your doctor if you are having problems. It's also a good idea to know your test results and keep a list of the medicines you take. How can you care for yourself at home? · Lose weight, if needed. It may reduce the number of times you stop breathing or have slowed breathing. · Sleep on your side. It may stop mild apnea. If you tend to roll onto your back, sew a pocket in the back of your paLifecrowd top. Put a tennis ball into the pocket, and stitch the pocket shut. This will help keep you from sleeping on your back.   · Avoid alcohol and medicines such as sleeping pills and sedatives before bed. · Do not smoke. Smoking can make sleep apnea worse. If you need help quitting, talk to your doctor about stop-smoking programs and medicines. These can increase your chances of quitting for good. · Prop up the head of your bed 4 to 6 inches by putting bricks under the legs of the bed. · Treat breathing problems, such as a stuffy nose, caused by a cold or allergies. · Try a continuous positive airway pressure (CPAP) breathing machine if your doctor recommends it. The machine keeps your airway open when you sleep. · If CPAP does not work for you, ask your doctor if you can try other breathing machines. A bilevel positive airway pressure machine uses one type of air pressure for breathing in and another type for breathing out. Another device raises or lowers air pressure as needed while you breathe. · Talk to your doctor if:  ¨ Your nose feels dry or bleeds when you use one of these machines. You may need to increase moisture in the air. A humidifier may help. ¨ Your nose is runny or stuffy from using a breathing machine. Decongestants or a corticosteroid nasal spray may help. ¨ You are sleepy during the day and it gets in the way of the normal things you do. Do not drive when you are drowsy. When should you call for help? Watch closely for changes in your health, and be sure to contact your doctor if:  · You still have sleep apnea even though you have made lifestyle changes. · You are thinking of trying a device such as CPAP. · You are having problems using a CPAP or similar machine. Where can you learn more? Go to http://cary-bhumika.info/. Enter Y507 in the search box to learn more about \"Sleep Apnea: Care Instructions. \"  Current as of: March 25, 2017  Content Version: 11.3  © 6451-9723 "[employer]. Care instructions adapted under license by [employer] (which disclaims liability or warranty for this information).  If you have questions about a medical condition or this instruction, always ask your healthcare professional. [address]  any warranty or liability for your use of this information. Stop the atenolol. Follow bp at home.   If consistently above 140/90, then start the hygroten, which I would take in the am.

## 2017-08-04 NOTE — MR AVS SNAPSHOT
Visit Information Date & Time Provider Department Dept. Phone Encounter #  
 8/4/2017  9:30 AM Jennifer Patiño, [address]  [id number] Follow-up Instructions Return in about 3 months (around 11/4/2017). Upcoming Health Maintenance Date Due Pneumococcal 19-64 Highest Risk (1 of 3 - PCV13) 12/27/1983 DTaP/Tdap/Td series (1 - Tdap) 12/27/1985 COLONOSCOPY 2/23/2015 Allergies as of 8/4/2017  Review Complete On: 8/4/2017 By: Morteza Fernández III, DO No Known Allergies Current Immunizations  Never Reviewed No immunizations on file. Not reviewed this visit You Were Diagnosed With   
  
 Codes Comments Fatigue, unspecified type    -  Primary ICD-10-CM: R53.83 ICD-9-CM: 780.79 Essential hypertension with goal blood pressure less than 140/90     ICD-10-CM: I10 
ICD-9-CM: 401.9 Chronic gout involving toe without tophus, unspecified cause, unspecified laterality     ICD-10-CM: M1A. 9XX0 
ICD-9-CM: 274.02 Cervical disc disorder     ICD-10-CM: M50.90 ICD-9-CM: 722.91 Obesity (BMI 30-39. 9)     ICD-10-CM: E66.9 ICD-9-CM: 278.00 Vitals BP Pulse Temp Resp Height(growth percentile) Weight(growth percentile) (!) 155/91 (BP 1 Location: Right arm, BP Patient Position: Sitting) (!) 56 97.8 °F (36.6 °C) (Oral) 18 6' 1\" (1.854 m) 292 lb (132.5 kg) SpO2 BMI Smoking Status 96% 38.52 kg/m2 Former Smoker Vitals History BMI and BSA Data Body Mass Index Body Surface Area 38.52 kg/m 2 2.61 m 2 Preferred Pharmacy Pharmacy Name Phone [phone] - [address], [address] AT [place] [address] [phone] Your Updated Medication List  
  
   
This list is accurate as of: 8/4/17 10:11 AM.  Always use your most recent med list.  
  
  
  
  
 allopurinol 300 mg tablet Commonly known as:  Tiffanie Richford  
 TAKE 1 TABLET BY MOUTH DAILY FOR GOUT  
  
 chlorthalidone 25 mg tablet Commonly known as:  Ala Nims Take 1 Tab by mouth daily. Indications: hypertension  
  
 citalopram 40 mg tablet Commonly known as:  Cristofer Hopes Take 1 Tab by mouth daily. cyclobenzaprine 5 mg tablet Commonly known as:  FLEXERIL Take 1 Tab by mouth three (3) times daily as needed for Muscle Spasm(s). gabapentin 100 mg capsule Commonly known as:  NEURONTIN  
TAKE 3 CAPSULES BY MOUTH THREE TIMES DAILY  
  
 lisinopril 20 mg tablet Commonly known as:  PRINIVIL, ZESTRIL  
TAKE 1 TABLET BY MOUTH DAILY  
  
 omeprazole-sodium bicarbonate 20-1.1 mg-gram capsule Commonly known as:  Radha Sol Take 1 Cap by mouth daily. PROBIOTIC PO Take  by mouth. traMADol 50 mg tablet Commonly known as:  ULTRAM  
Take 1 Tab by mouth every six (6) hours as needed for Pain for up to 10 days. Max Daily Amount: 200 mg. Prescriptions Printed Refills  
 chlorthalidone (HYGROTEN) 25 mg tablet 3 Sig: Take 1 Tab by mouth daily. Indications: hypertension Class: Print Route: Oral  
  
Prescriptions Sent to Pharmacy Refills  
 lisinopril (PRINIVIL, ZESTRIL) 20 mg tablet 1 Sig: TAKE 1 TABLET BY MOUTH DAILY Class: Normal  
 Pharmacy: [facility] [address] [place] [address] AT [address] [place] [address] Ph #: [phone]  
 allopurinol (ZYLOPRIM) 300 mg tablet 1 Sig: TAKE 1 TABLET BY MOUTH DAILY FOR GOUT Class: Normal  
 Pharmacy: [facility] [address] [place] [address] AT [address] [place] [address] Road Ph #: [phone]  
 gabapentin (NEURONTIN) 100 mg capsule 1 Sig: TAKE 3 CAPSULES BY MOUTH THREE TIMES DAILY Class: Normal  
 Pharmacy: [facility] [address] [place] [address] [address] [place] [address] Ph #: [phone] We Performed the Following HEMOGLOBIN A1C WITH EAG [99772 CPT(R)] LIPID PANEL [72231 CPT(R)] REFERRAL TO SLEEP STUDIES [REF99 Custom] Follow-up Instructions Return in about 3 months (around 11/4/2017). Referral Information Referral ID Referred By Referred To  
  
 [id number] MD [facility]   
   [address] [facility] [address] [place], [address] Phone: [phone] Fax: [phone] Visits Status Start Date End Date 1 New Request 8/4/17 8/4/18 If your referral has a status of pending review or denied, additional information will be sent to support the outcome of this decision. Patient Instructions Fatigue: Care Instructions Your Care Instructions Fatigue is a feeling of tiredness, exhaustion, or lack of energy. You may feel fatigue because of too much or not enough activity. It can also come from stress, lack of sleep, boredom, and poor diet. Many medical problems, such as viral infections, can cause fatigue. Emotional problems, especially depression, are often the cause of fatigue. Fatigue is most often a symptom of another problem. Treatment for fatigue depends on the cause. For example, if you have fatigue because you have a certain health problem, treating this problem also treats your fatigue. If depression or anxiety is the cause, treatment may help. Follow-up care is a key part of your treatment and safety. Be sure to make and go to all appointments, and call your doctor if you are having problems. It's also a good idea to know your test results and keep a list of the medicines you take. How can you care for yourself at home? · Get regular exercise. But don't overdo it. Go back and forth between rest and exercise. · Get plenty of rest. 
· Eat a healthy diet. Do not skip meals, especially breakfast. 
· Reduce your use of caffeine, tobacco, and alcohol.  Caffeine is most often found in coffee, tea, cola drinks, and chocolate. · Limit medicines that can cause fatigue. This includes tranquilizers and cold and allergy medicines. When should you call for help? Watch closely for changes in your health, and be sure to contact your doctor if: 
· You have new symptoms such as fever or a rash. · Your fatigue gets worse. · You have been feeling down, depressed, or hopeless. Or you may have lost interest in things that you usually enjoy. · You are not getting better as expected. Where can you learn more? Go to http://caryVista Therapeuticsbhumika.info/. Enter X048 in the search box to learn more about \"Fatigue: Care Instructions. \" Current as of: March 20, 2017 Content Version: 11.3 © 7886-4073 [employer]. Care instructions adapted under license by elmenus (which disclaims liability or warranty for this information). If you have questions about a medical condition or this instruction, always ask your healthcare professional. [place] [address] any warranty or liability for your use of this information. Sleep Apnea: Care Instructions Your Care Instructions Sleep apnea means that you frequently stop breathing for 10 seconds or longer during sleep. It can be mild to severe, based on the number of times an hour that you stop breathing or have slowed breathing. Blocked or narrowed airways in your nose, mouth, or throat can cause sleep apnea. Your airway can become blocked when your throat muscles and tongue relax during sleep. You can treat sleep apnea at home by making lifestyle changes. You also can use a CPAP breathing machine that keeps tissues in the throat from blocking your airway. Or your doctor may suggest that you use a breathing device while you sleep. It helps keep your airway open. This could be a device that you put in your mouth.  Other examples include strips or disks that you use on your nose. In some cases, surgery may be needed to remove enlarged tissues in the throat. Follow-up care is a key part of your treatment and safety. Be sure to make and go to all appointments, and call your doctor if you are having problems. It's also a good idea to know your test results and keep a list of the medicines you take. How can you care for yourself at home? · Lose weight, if needed. It may reduce the number of times you stop breathing or have slowed breathing. · Sleep on your side. It may stop mild apnea. If you tend to roll onto your back, sew a pocket in the back of your pajama top. Put a tennis ball into the pocket, and stitch the pocket shut. This will help keep you from sleeping on your back. · Avoid alcohol and medicines such as sleeping pills and sedatives before bed. · Do not smoke. Smoking can make sleep apnea worse. If you need help quitting, talk to your doctor about stop-smoking programs and medicines. These can increase your chances of quitting for good. · Prop up the head of your bed 4 to 6 inches by putting bricks under the legs of the bed. · Treat breathing problems, such as a stuffy nose, caused by a cold or allergies. · Try a continuous positive airway pressure (CPAP) breathing machine if your doctor recommends it. The machine keeps your airway open when you sleep. · If CPAP does not work for you, ask your doctor if you can try other breathing machines. A bilevel positive airway pressure machine uses one type of air pressure for breathing in and another type for breathing out. Another device raises or lowers air pressure as needed while you breathe. · Talk to your doctor if: 
¨ Your nose feels dry or bleeds when you use one of these machines. You may need to increase moisture in the air. A humidifier may help. ¨ Your nose is runny or stuffy from using a breathing machine. Decongestants or a corticosteroid nasal spray may help. ¨ You are sleepy during the day and it gets in the way of the normal things you do. Do not drive when you are drowsy. When should you call for help? Watch closely for changes in your health, and be sure to contact your doctor if: 
· You still have sleep apnea even though you have made lifestyle changes. · You are thinking of trying a device such as CPAP. · You are having problems using a CPAP or similar machine. Where can you learn more? Go to http://cary-bhumika.info/. Enter E022 in the search box to learn more about \"Sleep Apnea: Care Instructions. \" Current as of: March 25, 2017 Content Version: 11.3 © 1767-9114 [employer]. Care instructions adapted under license by [employer] (which disclaims liability or warranty for this information). If you have questions about a medical condition or this instruction, always ask your healthcare professional. [address] any warranty or liability for your use of this information. Stop the atenolol. Follow bp at home. If consistently above 140/90, then start the hygroten, which I would take in the am. 
 
 
  
Introducing [facility]! Dear Kamilah Ayala: 
Thank you for requesting a [employer] account. Our records indicate that you have previously registered for a MixRank account but its currently inactive. Please call our MixRank support line at [phone]. Additional Information If you have questions, please visit the Frequently Asked Questions section of the MixRank website at [email or web address] Remember, MixRank is NOT to be used for urgent needs. For medical emergencies, dial 911. Now available from your iPhone and Android! Please provide this summary of care documentation to your next provider. Your primary care clinician is listed as Estefania Escobedo If you have any questions after today's visit, please call [phone].
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
## 2017-08-18 DIAGNOSIS — F41.8 ANXIETY ASSOCIATED WITH DEPRESSION: ICD-10-CM

## 2017-08-18 DIAGNOSIS — Z76.89 ENCOUNTER TO ESTABLISH CARE: ICD-10-CM

## 2017-08-18 RX ORDER — CITALOPRAM 40 MG/1
40 TABLET, FILM COATED ORAL DAILY
Qty: 90 TAB | Refills: 3 | Status: SHIPPED | OUTPATIENT
Start: 2017-08-18 | End: 2018-08-21 | Stop reason: SDUPTHER

## 2017-08-20 DIAGNOSIS — I10 ESSENTIAL HYPERTENSION WITH GOAL BLOOD PRESSURE LESS THAN 140/90: ICD-10-CM

## 2017-08-20 RX ORDER — ATENOLOL 50 MG/1
TABLET ORAL
Qty: 90 TAB | Refills: 3 | Status: SHIPPED | OUTPATIENT
Start: 2017-08-20 | End: 2018-08-21 | Stop reason: SDUPTHER

## 2017-09-22 DIAGNOSIS — Z76.89 ENCOUNTER TO ESTABLISH CARE: ICD-10-CM

## 2017-09-22 DIAGNOSIS — M1A.9XX0 CHRONIC GOUT INVOLVING TOE WITHOUT TOPHUS, UNSPECIFIED CAUSE, UNSPECIFIED LATERALITY: ICD-10-CM

## 2017-09-25 RX ORDER — ALLOPURINOL 300 MG/1
TABLET ORAL
Qty: 90 TAB | Refills: 0 | Status: SHIPPED | OUTPATIENT
Start: 2017-09-25 | End: 2018-06-22 | Stop reason: SDUPTHER

## 2018-06-22 DIAGNOSIS — M1A.9XX0 CHRONIC GOUT INVOLVING TOE WITHOUT TOPHUS, UNSPECIFIED CAUSE, UNSPECIFIED LATERALITY: ICD-10-CM

## 2018-06-22 DIAGNOSIS — Z76.89 ENCOUNTER TO ESTABLISH CARE: ICD-10-CM

## 2018-06-25 RX ORDER — ALLOPURINOL 300 MG/1
TABLET ORAL
Qty: 90 TAB | Refills: 0 | Status: SHIPPED | OUTPATIENT
Start: 2018-06-25 | End: 2018-10-21 | Stop reason: SDUPTHER

## 2018-08-21 RX ORDER — CITALOPRAM 40 MG/1
TABLET, FILM COATED ORAL
Qty: 90 TAB | Refills: 0 | Status: SHIPPED | OUTPATIENT
Start: 2018-08-21 | End: 2019-01-26

## 2018-08-21 RX ORDER — GABAPENTIN 100 MG/1
CAPSULE ORAL
Qty: 270 CAP | Refills: 0 | Status: SHIPPED | OUTPATIENT
Start: 2018-08-21 | End: 2019-11-11

## 2018-08-21 RX ORDER — ATENOLOL 50 MG/1
TABLET ORAL
Qty: 90 TAB | Refills: 0 | Status: SHIPPED | OUTPATIENT
Start: 2018-08-21 | End: 2019-11-11 | Stop reason: DRUGHIGH

## 2018-10-21 DIAGNOSIS — Z76.89 ENCOUNTER TO ESTABLISH CARE: ICD-10-CM

## 2018-10-21 DIAGNOSIS — M1A.9XX0 CHRONIC GOUT INVOLVING TOE WITHOUT TOPHUS, UNSPECIFIED CAUSE, UNSPECIFIED LATERALITY: ICD-10-CM

## 2018-10-22 RX ORDER — ALLOPURINOL 300 MG/1
TABLET ORAL
Qty: 90 TAB | Refills: 0 | Status: SHIPPED | OUTPATIENT
Start: 2018-10-22 | End: 2018-10-30 | Stop reason: SDUPTHER

## 2018-10-30 DIAGNOSIS — Z76.89 ENCOUNTER TO ESTABLISH CARE: ICD-10-CM

## 2018-10-30 DIAGNOSIS — M1A.9XX0 CHRONIC GOUT INVOLVING TOE WITHOUT TOPHUS, UNSPECIFIED CAUSE, UNSPECIFIED LATERALITY: ICD-10-CM

## 2018-10-31 RX ORDER — ALLOPURINOL 300 MG/1
TABLET ORAL
Qty: 90 TAB | Refills: 0 | Status: SHIPPED | OUTPATIENT
Start: 2018-10-31

## 2019-01-26 ENCOUNTER — APPOINTMENT (OUTPATIENT)
Dept: CT IMAGING | Age: 54
End: 2019-01-26
Attending: EMERGENCY MEDICINE
Payer: SELF-PAY

## 2019-01-26 ENCOUNTER — HOSPITAL ENCOUNTER (EMERGENCY)
Age: 54
Discharge: HOME | End: 2019-01-26
Attending: EMERGENCY MEDICINE
Payer: SELF-PAY

## 2019-01-26 VITALS
SYSTOLIC BLOOD PRESSURE: 134 MMHG | DIASTOLIC BLOOD PRESSURE: 78 MMHG | OXYGEN SATURATION: 94 % | RESPIRATION RATE: 16 BRPM | HEART RATE: 63 BPM | BODY MASS INDEX: 35.5 KG/M2 | HEIGHT: 73 IN | WEIGHT: 267.86 LBS | TEMPERATURE: 97.4 F

## 2019-01-26 DIAGNOSIS — R11.2 NAUSEA AND VOMITING, INTRACTABILITY OF VOMITING NOT SPECIFIED, UNSPECIFIED VOMITING TYPE: ICD-10-CM

## 2019-01-26 DIAGNOSIS — K57.30 DIVERTICULOSIS OF COLON: ICD-10-CM

## 2019-01-26 DIAGNOSIS — R10.32 ABDOMINAL PAIN, LLQ (LEFT LOWER QUADRANT): Primary | ICD-10-CM

## 2019-01-26 DIAGNOSIS — E86.0 DEHYDRATION: ICD-10-CM

## 2019-01-26 LAB
ALBUMIN SERPL-MCNC: 3.9 G/DL (ref 3.5–5)
ALBUMIN/GLOB SERPL: 0.8 {RATIO} (ref 1.1–2.2)
ALP SERPL-CCNC: 84 U/L (ref 45–117)
ALT SERPL-CCNC: 31 U/L (ref 12–78)
ANION GAP SERPL CALC-SCNC: 8 MMOL/L (ref 5–15)
APPEARANCE UR: CLEAR
AST SERPL-CCNC: 18 U/L (ref 15–37)
BACTERIA URNS QL MICRO: ABNORMAL /HPF
BASOPHILS # BLD: 0.1 K/UL (ref 0–0.1)
BASOPHILS NFR BLD: 0 % (ref 0–1)
BILIRUB SERPL-MCNC: 0.7 MG/DL (ref 0.2–1)
BILIRUB UR QL CFM: NEGATIVE
BUN SERPL-MCNC: 23 MG/DL (ref 6–20)
BUN/CREAT SERPL: 23 (ref 12–20)
CALCIUM SERPL-MCNC: 9.3 MG/DL (ref 8.5–10.1)
CHLORIDE SERPL-SCNC: 98 MMOL/L (ref 97–108)
CO2 SERPL-SCNC: 29 MMOL/L (ref 21–32)
COLOR UR: ABNORMAL
CREAT SERPL-MCNC: 0.98 MG/DL (ref 0.7–1.3)
DIFFERENTIAL METHOD BLD: ABNORMAL
EOSINOPHIL # BLD: 0.1 K/UL (ref 0–0.4)
EOSINOPHIL NFR BLD: 1 % (ref 0–7)
EPITH CASTS URNS QL MICRO: ABNORMAL /LPF
ERYTHROCYTE [DISTWIDTH] IN BLOOD BY AUTOMATED COUNT: 13.4 % (ref 11.5–14.5)
GLOBULIN SER CALC-MCNC: 4.6 G/DL (ref 2–4)
GLUCOSE SERPL-MCNC: 127 MG/DL (ref 65–100)
GLUCOSE UR STRIP.AUTO-MCNC: NEGATIVE MG/DL
HCT VFR BLD AUTO: 48.3 % (ref 36.6–50.3)
HGB BLD-MCNC: 16.1 G/DL (ref 12.1–17)
HGB UR QL STRIP: NEGATIVE
IMM GRANULOCYTES # BLD AUTO: 0.1 K/UL (ref 0–0.04)
IMM GRANULOCYTES NFR BLD AUTO: 0 % (ref 0–0.5)
KETONES UR QL STRIP.AUTO: NEGATIVE MG/DL
LACTATE SERPL-SCNC: 1.1 MMOL/L (ref 0.4–2)
LEUKOCYTE ESTERASE UR QL STRIP.AUTO: NEGATIVE
LIPASE SERPL-CCNC: 84 U/L (ref 73–393)
LYMPHOCYTES # BLD: 2.7 K/UL (ref 0.8–3.5)
LYMPHOCYTES NFR BLD: 18 % (ref 12–49)
MCH RBC QN AUTO: 31.1 PG (ref 26–34)
MCHC RBC AUTO-ENTMCNC: 33.3 G/DL (ref 30–36.5)
MCV RBC AUTO: 93.2 FL (ref 80–99)
MONOCYTES # BLD: 1.2 K/UL (ref 0–1)
MONOCYTES NFR BLD: 8 % (ref 5–13)
MUCOUS THREADS URNS QL MICRO: ABNORMAL /LPF
NEUTS SEG # BLD: 10.6 K/UL (ref 1.8–8)
NEUTS SEG NFR BLD: 73 % (ref 32–75)
NITRITE UR QL STRIP.AUTO: NEGATIVE
NRBC # BLD: 0 K/UL (ref 0–0.01)
NRBC BLD-RTO: 0 PER 100 WBC
PH UR STRIP: 5.5 [PH] (ref 5–8)
PLATELET # BLD AUTO: 366 K/UL (ref 150–400)
PMV BLD AUTO: 10.1 FL (ref 8.9–12.9)
POTASSIUM SERPL-SCNC: 3.9 MMOL/L (ref 3.5–5.1)
PROT SERPL-MCNC: 8.5 G/DL (ref 6.4–8.2)
PROT UR STRIP-MCNC: ABNORMAL MG/DL
RBC # BLD AUTO: 5.18 M/UL (ref 4.1–5.7)
RBC #/AREA URNS HPF: ABNORMAL /HPF (ref 0–5)
SODIUM SERPL-SCNC: 135 MMOL/L (ref 136–145)
SP GR UR REFRACTOMETRY: >1.03 (ref 1–1.03)
UA: UC IF INDICATED,UAUC: ABNORMAL
UROBILINOGEN UR QL STRIP.AUTO: 0.2 EU/DL (ref 0.2–1)
WBC # BLD AUTO: 14.7 K/UL (ref 4.1–11.1)
WBC URNS QL MICRO: ABNORMAL /HPF (ref 0–4)

## 2019-01-26 PROCEDURE — 99284 EMERGENCY DEPT VISIT MOD MDM: CPT

## 2019-01-26 PROCEDURE — 83690 ASSAY OF LIPASE: CPT

## 2019-01-26 PROCEDURE — 81001 URINALYSIS AUTO W/SCOPE: CPT

## 2019-01-26 PROCEDURE — 36415 COLL VENOUS BLD VENIPUNCTURE: CPT

## 2019-01-26 PROCEDURE — 74011636320 HC RX REV CODE- 636/320: Performed by: EMERGENCY MEDICINE

## 2019-01-26 PROCEDURE — 87086 URINE CULTURE/COLONY COUNT: CPT

## 2019-01-26 PROCEDURE — 83605 ASSAY OF LACTIC ACID: CPT

## 2019-01-26 PROCEDURE — 96374 THER/PROPH/DIAG INJ IV PUSH: CPT

## 2019-01-26 PROCEDURE — 74011250636 HC RX REV CODE- 250/636: Performed by: EMERGENCY MEDICINE

## 2019-01-26 PROCEDURE — 96361 HYDRATE IV INFUSION ADD-ON: CPT

## 2019-01-26 PROCEDURE — 96375 TX/PRO/DX INJ NEW DRUG ADDON: CPT

## 2019-01-26 PROCEDURE — 80053 COMPREHEN METABOLIC PANEL: CPT

## 2019-01-26 PROCEDURE — 74177 CT ABD & PELVIS W/CONTRAST: CPT

## 2019-01-26 PROCEDURE — 85025 COMPLETE CBC W/AUTO DIFF WBC: CPT

## 2019-01-26 PROCEDURE — 96376 TX/PRO/DX INJ SAME DRUG ADON: CPT

## 2019-01-26 RX ORDER — FENTANYL CITRATE 50 UG/ML
50 INJECTION, SOLUTION INTRAMUSCULAR; INTRAVENOUS
Status: COMPLETED | OUTPATIENT
Start: 2019-01-26 | End: 2019-01-26

## 2019-01-26 RX ORDER — HYDROCODONE BITARTRATE AND ACETAMINOPHEN 5; 325 MG/1; MG/1
1 TABLET ORAL
Qty: 20 TAB | Refills: 0 | Status: SHIPPED | OUTPATIENT
Start: 2019-01-26 | End: 2019-11-11

## 2019-01-26 RX ORDER — ONDANSETRON 4 MG/1
4 TABLET, ORALLY DISINTEGRATING ORAL
Qty: 10 TAB | Refills: 0 | Status: SHIPPED | OUTPATIENT
Start: 2019-01-26 | End: 2019-11-11

## 2019-01-26 RX ORDER — CIPROFLOXACIN 500 MG/1
500 TABLET ORAL 2 TIMES DAILY
Qty: 20 TAB | Refills: 0 | Status: SHIPPED | OUTPATIENT
Start: 2019-01-26 | End: 2019-02-05

## 2019-01-26 RX ORDER — ONDANSETRON 2 MG/ML
4 INJECTION INTRAMUSCULAR; INTRAVENOUS
Status: COMPLETED | OUTPATIENT
Start: 2019-01-26 | End: 2019-01-26

## 2019-01-26 RX ORDER — METRONIDAZOLE 500 MG/1
500 TABLET ORAL 3 TIMES DAILY
Qty: 30 TAB | Refills: 0 | Status: SHIPPED | OUTPATIENT
Start: 2019-01-26 | End: 2019-11-11 | Stop reason: ALTCHOICE

## 2019-01-26 RX ORDER — SODIUM CHLORIDE 0.9 % (FLUSH) 0.9 %
10 SYRINGE (ML) INJECTION
Status: COMPLETED | OUTPATIENT
Start: 2019-01-26 | End: 2019-01-26

## 2019-01-26 RX ADMIN — Medication 10 ML: at 15:50

## 2019-01-26 RX ADMIN — DIATRIZOATE MEGLUMINE AND DIATRIZOATE SODIUM 30 ML: 660; 100 LIQUID ORAL; RECTAL at 14:24

## 2019-01-26 RX ADMIN — IOPAMIDOL 100 ML: 755 INJECTION, SOLUTION INTRAVENOUS at 15:50

## 2019-01-26 RX ADMIN — FENTANYL CITRATE 50 MCG: 50 INJECTION, SOLUTION INTRAMUSCULAR; INTRAVENOUS at 16:59

## 2019-01-26 RX ADMIN — ONDANSETRON 4 MG: 2 INJECTION INTRAMUSCULAR; INTRAVENOUS at 14:24

## 2019-01-26 RX ADMIN — ONDANSETRON 4 MG: 2 INJECTION INTRAMUSCULAR; INTRAVENOUS at 16:59

## 2019-01-26 RX ADMIN — SODIUM CHLORIDE 1000 ML: 900 INJECTION, SOLUTION INTRAVENOUS at 14:24

## 2019-01-26 RX ADMIN — FENTANYL CITRATE 50 MCG: 50 INJECTION, SOLUTION INTRAMUSCULAR; INTRAVENOUS at 14:24

## 2019-01-26 NOTE — ED NOTES
Bedside shift change report given to [address] (oncoming nurse) by Anastasiya Hines (offgoing nurse). Report included the following information SBAR, ED Summary, MAR and Recent Results.

## 2019-01-26 NOTE — ED NOTES
Pt given discharge instructions by MD. No questions at this time. pt leaves ambulatory with steady gait

## 2019-01-26 NOTE — ED NOTES
Complaint of llq abdominal pain with n/vd since yesterday. Pt has hx of diverticulitis, and this pain feels similar

## 2019-01-26 NOTE — DISCHARGE INSTRUCTIONS
Patient Education        Abdominal Pain: Care Instructions  Your Care Instructions    Abdominal pain has many possible causes. Some aren't serious and get better on their own in a few days. Others need more testing and treatment. If your pain continues or gets worse, you need to be rechecked and may need more tests to find out what is wrong. You may need surgery to correct the problem. Don't ignore new symptoms, such as fever, nausea and vomiting, urination problems, pain that gets worse, and dizziness. These may be signs of a more serious problem. Your doctor may have recommended a follow-up visit in the next 8 to 12 hours. If you are not getting better, you may need more tests or treatment. The doctor has checked you carefully, but problems can develop later. If you notice any problems or new symptoms, get medical treatment right away. Follow-up care is a key part of your treatment and safety. Be sure to make and go to all appointments, and call your doctor if you are having problems. It's also a good idea to know your test results and keep a list of the medicines you take. How can you care for yourself at home? · Rest until you feel better. · To prevent dehydration, drink plenty of fluids, enough so that your urine is light yellow or clear like water. Choose water and other caffeine-free clear liquids until you feel better. If you have kidney, heart, or liver disease and have to limit fluids, talk with your doctor before you increase the amount of fluids you drink. · If your stomach is upset, eat mild foods, such as rice, dry toast or crackers, bananas, and applesauce. Try eating several small meals instead of two or three large ones. · Wait until 48 hours after all symptoms have gone away before you have spicy foods, alcohol, and drinks that contain caffeine. · Do not eat foods that are high in fat. · Avoid anti-inflammatory medicines such as aspirin, ibuprofen (Advil, Motrin), and naproxen (Aleve). These can cause stomach upset. Talk to your doctor if you take daily aspirin for another health problem. When should you call for help? Call 911 anytime you think you may need emergency care. For example, call if:    · You passed out (lost consciousness).     · You pass maroon or very bloody stools.     · You vomit blood or what looks like coffee grounds.     · You have new, severe belly pain.    Call your doctor now or seek immediate medical care if:    · Your pain gets worse, especially if it becomes focused in one area of your belly.     · You have a new or higher fever.     · Your stools are black and look like tar, or they have streaks of blood.     · You have unexpected vaginal bleeding.     · You have symptoms of a urinary tract infection. These may include:  ? Pain when you urinate. ? Urinating more often than usual.  ? Blood in your urine.     · You are dizzy or lightheaded, or you feel like you may faint.    Watch closely for changes in your health, and be sure to contact your doctor if:    · You are not getting better after 1 day (24 hours). Where can you learn more? Go to http://cary-bhumika.info/. Enter N786 in the search box to learn more about \"Abdominal Pain: Care Instructions. \"  Current as of: September 23, 2018  Content Version: 11.9  © 7470-0586 [employer]. Care instructions adapted under license by [employer] (which disclaims liability or warranty for this information). If you have questions about a medical condition or this instruction, always ask your healthcare professional. [place] [address] any warranty or liability for your use of this information. Patient Education        Dehydration: Care Instructions  Your Care Instructions  Dehydration happens when your body loses too much fluid.  This might happen when you do not drink enough water or you lose large amounts of fluids from your body because of diarrhea, vomiting, or sweating. Severe dehydration can be life-threatening. Water and minerals called electrolytes help put your body fluids back in balance. Learn the early signs of fluid loss, and drink more fluids to prevent dehydration. Follow-up care is a key part of your treatment and safety. Be sure to make and go to all appointments, and call your doctor if you are having problems. It's also a good idea to know your test results and keep a list of the medicines you take. How can you care for yourself at home? · To prevent dehydration, drink plenty of fluids, enough so that your urine is light yellow or clear like water. Choose water and other caffeine-free clear liquids until you feel better. If you have kidney, heart, or liver disease and have to limit fluids, talk with your doctor before you increase the amount of fluids you drink. · If you do not feel like eating or drinking, try taking small sips of water, sports drinks, or other rehydration drinks. · Get plenty of rest.  To prevent dehydration  · Add more fluids to your diet and daily routine, unless your doctor has told you not to. · During hot weather, drink more fluids. Drink even more fluids if you exercise a lot. Stay away from drinks with alcohol or caffeine. · Watch for the symptoms of dehydration. These include:  ? A dry, sticky mouth. ? Dark yellow urine, and not much of it. ? Dry and sunken eyes. ? Feeling very tired. · Learn what problems can lead to dehydration. These include:  ? Diarrhea, fever, and vomiting. ? Any illness with a fever, such as pneumonia or the flu. ? Activities that cause heavy sweating, such as endurance races and heavy outdoor work in hot or humid weather. ? Alcohol or drug abuse or withdrawal.  ? Certain medicines, such as cold and allergy pills (antihistamines), diet pills (diuretics), and laxatives. ? Certain diseases, such as diabetes, cancer, and heart or kidney disease. When should you call for help?   Call 911 anytime you think you may need emergency care. For example, call if:    · You passed out (lost consciousness).    Call your doctor now or seek immediate medical care if:    · You are confused and cannot think clearly.     · You are dizzy or lightheaded, or you feel like you may faint.     · You have signs of needing more fluids. You have sunken eyes and a dry mouth, and you pass only a little dark urine.     · You cannot keep fluids down.    Watch closely for changes in your health, and be sure to contact your doctor if:    · You are not making tears.     · Your skin is very dry and sags slowly back into place after you pinch it.     · Your mouth and eyes are very dry. Where can you learn more? Go to http://cary-bhumika.info/. Enter C841 in the search box to learn more about \"Dehydration: Care Instructions. \"  Current as of: September 23, 2018  Content Version: 11.9  © 4920-3415 [employer]. Care instructions adapted under license by [employer] (which disclaims liability or warranty for this information). If you have questions about a medical condition or this instruction, always ask your healthcare professional. [place] [address] any warranty or liability for your use of this information. Patient Education        Diverticulosis: Care Instructions  Your Care Instructions  In diverticulosis, pouches called diverticula form in the wall of the large intestine (colon). The pouches do not cause any pain or other symptoms. Most people who have diverticulosis do not know they have it. But the pouches sometimes bleed, and if they become infected, they can cause pain and other symptoms. When this happens, it is called diverticulitis. Diverticula form when pressure pushes the wall of the colon outward at certain weak points. A diet that is too low in fiber can cause diverticula. Follow-up care is a key part of your treatment and safety.  Be sure to make and go to all appointments, and call your doctor if you are having problems. It's also a good idea to know your test results and keep a list of the medicines you take. How can you care for yourself at home? · Include fruits, leafy green vegetables, beans, and whole grains in your diet each day. These foods are high in fiber. · Take a fiber supplement, such as Citrucel or Metamucil, every day if needed. Read and follow all instructions on the label. · Drink plenty of fluids, enough so that your urine is light yellow or clear like water. If you have kidney, heart, or liver disease and have to limit fluids, talk with your doctor before you increase the amount of fluids you drink. · Get at least 30 minutes of exercise on most days of the week. Walking is a good choice. You also may want to do other activities, such as running, swimming, cycling, or playing tennis or team sports. · Cut out foods that cause gas, pain, or other symptoms. When should you call for help? Call your doctor now or seek immediate medical care if:    · You have belly pain.     · You pass maroon or very bloody stools.     · You have a fever.     · You have nausea and vomiting.     · You have unusual changes in your bowel movements or abdominal swelling.     · You have burning pain when you urinate.     · You have abnormal vaginal discharge.     · You have shoulder pain.     · You have cramping pain that does not get better when you have a bowel movement or pass gas.     · You pass gas or stool from your urethra while urinating.    Watch closely for changes in your health, and be sure to contact your doctor if you have any problems. Where can you learn more? Go to http://cary-bhumika.info/. Enter X830 in the search box to learn more about \"Diverticulosis: Care Instructions. \"  Current as of: March 27, 2018  Content Version: 11.9  © 5536-0541 [employer].  Care instructions adapted under license by [employer] (which disclaims liability or warranty for this information). If you have questions about a medical condition or this instruction, always ask your healthcare professional. [place] [address] any warranty or liability for your use of this information. Patient Education        Nausea and Vomiting: Care Instructions  Your Care Instructions    When you are nauseated, you may feel weak and sweaty and notice a lot of saliva in your mouth. Nausea often leads to vomiting. Most of the time you do not need to worry about nausea and vomiting, but they can be signs of other illnesses. Two common causes of nausea and vomiting are stomach flu and food poisoning. Nausea and vomiting from viral stomach flu will usually start to improve within 24 hours. Nausea and vomiting from food poisoning may last from 12 to 48 hours. The doctor has checked you carefully, but problems can develop later. If you notice any problems or new symptoms, get medical treatment right away. Follow-up care is a key part of your treatment and safety. Be sure to make and go to all appointments, and call your doctor if you are having problems. It's also a good idea to know your test results and keep a list of the medicines you take. How can you care for yourself at home? · To prevent dehydration, drink plenty of fluids, enough so that your urine is light yellow or clear like water. Choose water and other caffeine-free clear liquids until you feel better. If you have kidney, heart, or liver disease and have to limit fluids, talk with your doctor before you increase the amount of fluids you drink. · Rest in bed until you feel better. · When you are able to eat, try clear soups, mild foods, and liquids until all symptoms are gone for 12 to 48 hours. Other good choices include dry toast, crackers, cooked cereal, and gelatin dessert, such as Jell-O. When should you call for help?   Call 911 anytime you think you may need emergency care. For example, call if:    · You passed out (lost consciousness).    Call your doctor now or seek immediate medical care if:    · You have symptoms of dehydration, such as:  ? Dry eyes and a dry mouth. ? Passing only a little dark urine. ? Feeling thirstier than usual.     · You have new or worsening belly pain.     · You have a new or higher fever.     · You vomit blood or what looks like coffee grounds.    Watch closely for changes in your health, and be sure to contact your doctor if:    · You have ongoing nausea and vomiting.     · Your vomiting is getting worse.     · Your vomiting lasts longer than 2 days.     · You are not getting better as expected. Where can you learn more? Go to http://cary-bhumika.info/. Enter 25 [id number] in the search box to learn more about \"Nausea and Vomiting: Care Instructions. \"  Current as of: September 23, 2018  Content Version: 11.9  © 5430-7658 [employer]. Care instructions adapted under license by [employer] (which disclaims liability or warranty for this information). If you have questions about a medical condition or this instruction, always ask your healthcare professional. [place] [address] any warranty or liability for your use of this information.

## 2019-01-26 NOTE — ED PROVIDER NOTES
EMERGENCY DEPARTMENT HISTORY AND PHYSICAL EXAM 
 
 
Date: 1/26/2019 Patient Name: Tracy Ramirez History of Presenting Illness Chief Complaint Patient presents with  Flank Pain Left sided flank pain with nausea and vomiting. Denies diarrhea and urinary symptoms. PMH of diverticulitis History Provided By: Patient HPI: Tracy Ramirez, 46 y.o. male with PMHx significant for diverticulitis, pancreatitis, GERD, kidney stones, HTN, presents by private vehicle to the ED with c/o constant L sided abdominal since yesterday morning. He reports his pain is currently 6-7/10 in severity, but is 8-9/10 at the worst. Pt notes he did not eat much yesterday. He states he tried taking his medication this morning, but was unable to keep it down d/t nausea and 4 episodes of vomiting. Pt conveys his symptoms feel like prior diverticulitis flares. He reports \"little diarrhea\" yesterday, but has not had any BMs since then. Pt notes additional subjective fevers today, but has not checked his temperature. He states his urine has appeared to be dark, but denies any dysuria or urinary frequency. Pt specifically denies any recent back pain, CP, or SOB. There are no other complaints, changes, or physical findings at this time. PCP: Terri Riggs, DO No current facility-administered medications on file prior to encounter. Current Outpatient Medications on File Prior to Encounter Medication Sig Dispense Refill  allopurinol (ZYLOPRIM) 300 mg tablet TAKE 1 TABLET BY MOUTH DAILY FOR GOUT 90 Tab 0  
 gabapentin (NEURONTIN) 100 mg capsule TAKE 3 CAPSULES BY MOUTH THREE TIMES DAILY 270 Cap 0  
 atenolol (TENORMIN) 50 mg tablet TAKE 1 TABLET BY MOUTH EVERY DAY 90 Tab 0  
 lisinopril (PRINIVIL, ZESTRIL) 20 mg tablet TAKE 1 TABLET BY MOUTH DAILY 90 Tab 3  
 omeprazole-sodium bicarbonate (ZEGERID) 20-1.1 mg-gram capsule Take 1 Cap by mouth daily.  LACTOBACILLUS ACIDOPHILUS (PROBIOTIC PO) Take  by mouth.  allopurinol (ZYLOPRIM) 300 mg tablet TAKE 1 TABLET BY MOUTH DAILY FOR GOUT 90 Tab 1 Past History Past Medical History: 
Past Medical History:  
Diagnosis Date  Anxiety  Arthritis   
 back Leah Gut  Asthma Pt denies any sx currently 12/2/15  Chronic pain Rt knee; has had cortisone shots  Diverticulitis   
 diverticulosis, pancreatitis  GERD (gastroesophageal reflux disease)  Gout Right and left toes:  on preventative Rx so denies any problems  History of kidney stones x1  
 Hypertension  MRSA (methicillin resistant staph aureus) culture positive 9/8/15  
 nares, treated with ABX  Psychiatric disorder   
 anxiety Past Surgical History: 
Past Surgical History:  
Procedure Laterality Date  HX CERVICAL FUSION  9/22/15 C4-7  
 HX COLONOSCOPY    
 HX ORTHOPAEDIC    
 right knee arthroscopy and cyst removed Family History: 
Family History Problem Relation Age of Onset  Cancer Mother CA COLON  
 Hypertension Mother  Hypertension Father  Kidney Disease Father Social History: 
Social History Tobacco Use  Smoking status: Former Smoker Packs/day: 0.50 Years: 32.00 Pack years: 16.00 Last attempt to quit: 2009 Years since quittin.1  Smokeless tobacco: Never Used  Tobacco comment: Quit 2 yrs ago Substance Use Topics  Alcohol use: No  
  Alcohol/week: 0.0 oz  Drug use: No  
 
 
Allergies: 
No Known Allergies Review of Systems Review of Systems Constitutional: Positive for fever. Negative for chills. HENT: Negative. Negative for congestion. Eyes: Negative. Respiratory: Negative for shortness of breath. Cardiovascular: Negative for chest pain. Gastrointestinal: Positive for abdominal pain, diarrhea, nausea and vomiting. Endocrine: Negative for heat intolerance. Genitourinary: Negative for dysuria and frequency. +dark urine Musculoskeletal: Negative for myalgias. Skin: Negative for rash. Allergic/Immunologic: Negative for immunocompromised state. Neurological: Negative for dizziness and headaches. Hematological: Does not bruise/bleed easily. Psychiatric/Behavioral: Negative. All other systems reviewed and are negative. Physical Exam  
Physical Exam  
Constitutional: He is oriented to person, place, and time. He appears well-developed. Elevated BMI In moderate distress HENT:  
Head: Normocephalic and atraumatic. Eyes: EOM are normal. Pupils are equal, round, and reactive to light. Neck: Normal range of motion. Neck supple. Cardiovascular: Normal rate, regular rhythm and normal heart sounds. Pulmonary/Chest: Effort normal and breath sounds normal. He has no wheezes. Abdominal: Soft. Bowel sounds are normal.  
Tenderness in LUQ > LLQ Palpating R side of abdomen produces pain on L side No CVAT Musculoskeletal: Normal range of motion. He exhibits no edema or tenderness. Back non-tender Neurological: He is alert and oriented to person, place, and time. No cranial nerve deficit. Skin: Skin is warm and dry. Psychiatric: He has a normal mood and affect. His behavior is normal.  
Nursing note and vitals reviewed. Diagnostic Study Results Labs - Recent Results (from the past 12 hour(s)) URINALYSIS W/ REFLEX CULTURE Collection Time: 01/26/19  1:41 PM  
Result Value Ref Range Color DARK YELLOW Appearance CLEAR CLEAR Specific gravity >1.030 (H) 1.003 - 1.030  
 pH (UA) 5.5 5.0 - 8.0 Protein TRACE (A) NEG mg/dL Glucose NEGATIVE  NEG mg/dL Ketone NEGATIVE  NEG mg/dL Blood NEGATIVE  NEG Urobilinogen 0.2 0.2 - 1.0 EU/dL Nitrites NEGATIVE  NEG Leukocyte Esterase NEGATIVE  NEG    
 WBC 0-4 0 - 4 /hpf  
 RBC 0-5 0 - 5 /hpf Epithelial cells FEW FEW /lpf  Bacteria 1+ (A) NEG /hpf  
 UA: UC IF INDICATED URINE CULTURE ORDERED (A) CNI Mucus 3+ (A) NEG /lpf  
BILIRUBIN, CONFIRM Collection Time: 01/26/19  1:41 PM  
Result Value Ref Range Bilirubin UA, confirm NEGATIVE  NEG    
CBC WITH AUTOMATED DIFF Collection Time: 01/26/19  1:48 PM  
Result Value Ref Range WBC 14.7 (H) 4.1 - 11.1 K/uL  
 RBC 5.18 4.10 - 5.70 M/uL  
 HGB 16.1 12.1 - 17.0 g/dL HCT 48.3 36.6 - 50.3 % MCV 93.2 80.0 - 99.0 FL  
 MCH 31.1 26.0 - 34.0 PG  
 MCHC 33.3 30.0 - 36.5 g/dL  
 RDW 13.4 11.5 - 14.5 % PLATELET 967 626 - 269 K/uL MPV 10.1 8.9 - 12.9 FL  
 NRBC 0.0 0  WBC ABSOLUTE NRBC 0.00 0.00 - 0.01 K/uL NEUTROPHILS 73 32 - 75 % LYMPHOCYTES 18 12 - 49 % MONOCYTES 8 5 - 13 % EOSINOPHILS 1 0 - 7 % BASOPHILS 0 0 - 1 % IMMATURE GRANULOCYTES 0 0.0 - 0.5 % ABS. NEUTROPHILS 10.6 (H) 1.8 - 8.0 K/UL  
 ABS. LYMPHOCYTES 2.7 0.8 - 3.5 K/UL  
 ABS. MONOCYTES 1.2 (H) 0.0 - 1.0 K/UL  
 ABS. EOSINOPHILS 0.1 0.0 - 0.4 K/UL  
 ABS. BASOPHILS 0.1 0.0 - 0.1 K/UL  
 ABS. IMM. GRANS. 0.1 (H) 0.00 - 0.04 K/UL  
 DF AUTOMATED METABOLIC PANEL, COMPREHENSIVE Collection Time: 01/26/19  1:48 PM  
Result Value Ref Range Sodium 135 (L) 136 - 145 mmol/L Potassium 3.9 3.5 - 5.1 mmol/L Chloride 98 97 - 108 mmol/L  
 CO2 29 21 - 32 mmol/L Anion gap 8 5 - 15 mmol/L Glucose 127 (H) 65 - 100 mg/dL BUN 23 (H) 6 - 20 MG/DL Creatinine 0.98 0.70 - 1.30 MG/DL  
 BUN/Creatinine ratio 23 (H) 12 - 20 GFR est AA >60 >60 ml/min/1.73m2 GFR est non-AA >60 >60 ml/min/1.73m2 Calcium 9.3 8.5 - 10.1 MG/DL Bilirubin, total 0.7 0.2 - 1.0 MG/DL  
 ALT (SGPT) 31 12 - 78 U/L  
 AST (SGOT) 18 15 - 37 U/L Alk. phosphatase 84 45 - 117 U/L Protein, total 8.5 (H) 6.4 - 8.2 g/dL Albumin 3.9 3.5 - 5.0 g/dL Globulin 4.6 (H) 2.0 - 4.0 g/dL A-G Ratio 0.8 (L) 1.1 - 2.2 LIPASE Collection Time: 01/26/19  1:48 PM  
Result Value Ref Range  Lipase 84 73 - 393 U/L  
 LACTIC ACID Collection Time: 01/26/19  2:23 PM  
Result Value Ref Range Lactic acid 1.1 0.4 - 2.0 MMOL/L Radiologic Studies -  
CT Results  (Last 48 hours) 01/26/19 1549  CT ABD PELV W CONT Final result Impression:  IMPRESSION:   
1. No diverticulitis or other acute abdominal or pelvic abnormality. 2. Hepatic steatosis. 3. Diverticulosis. 4. Lumbar spondylosis. Narrative:  EXAM: CT ABDOMEN PELVIS WITH CONTRAST INDICATION: Abdominal pain. Diverticulitis. COMPARISON: 5/17/2016. CONTRAST: 100 mL of Isovue-370. TECHNIQUE:   
Multislice helical CT was performed from the diaphragm to the symphysis pubis  
during uneventful rapid bolus intravenous contrast administration. Oral contrast  
was also administered. Contiguous 5 mm axial images were reconstructed and lung  
and soft tissue windows were generated. Coronal and sagittal reformations were  
generated. CT dose reduction was achieved through use of a standardized protocol  
tailored for this examination and automatic exposure control for dose  
modulation. FINDINGS:  
LOWER CHEST: The visualized portions of the lung bases are clear. ABDOMEN:  
Liver: The liver is normal in size and contour with no focal abnormality. Attenuation of the liver is decreased throughout. Gallbladder and bile ducts: There is no calcified gallstone or biliary  
dilatation. Spleen: No abnormality. Pancreas: No abnormality. Adrenal glands: No abnormality. Kidneys: No abnormality. PELVIS:  
Reproductive organs: The prostate gland and seminal vesicles are symmetrical.   
Bladder: No abnormality. BOWEL AND MESENTERY: The small bowel is normal.  There is no mesenteric mass or  
adenopathy. The appendix is normal.  There are a few diverticula of the colon  
without diverticulitis. PERITONEUM: There is no ascites or free intraperitoneal air. RETROPERITONEUM: The aorta  tapers without aneurysm.  There is no retroperitoneal  
adenopathy or mass. There is no pelvic mass or adenopathy. BONES AND SOFT TISSUES: There are degenerative changes of the lumbar spine. Medical Decision Making I am the first provider for this patient. I reviewed the vital signs, available nursing notes, past medical history, past surgical history, family history and social history. Vital Signs-Reviewed the patient's vital signs. Patient Vitals for the past 12 hrs: 
 Temp Pulse Resp BP SpO2  
01/26/19 1703  63 16 134/78 94 % 01/26/19 1405     97 % 01/26/19 1332 97.4 °F (36.3 °C) 68 20 (!) 156/99 100 % Pulse Oximetry Analysis - 100% on RA Records Reviewed: Nursing Notes, Old Medical Records, Previous Radiology Studies and Previous Laboratory Studies Provider Notes (Medical Decision Making): DDx: diverticulitis, abscess, UTI, dehydration, electrolyte abnormality, obstruction, pancreatitis ED Course:  
Initial assessment performed. The patients presenting problems have been discussed, and they are in agreement with the care plan formulated and outlined with them. I have encouraged them to ask questions as they arise throughout their visit. 2:55 PM 
Reassessed pt. He states his pain is down to 5/10 in severity and his nausea has improved. Updated him on available results. Discharge Note: 
5:47 PM 
The patient has been re-evaluated and is ready for discharge. Reviewed available results with patient. Counseled patient on diagnosis and care plan. Patient has expressed understanding, and all questions have been answered. Patient agrees with plan and agrees to follow up as recommended, or to return to the ED if their symptoms worsen. Discharge instructions have been provided and explained to the patient, along with reasons to return to the ED. PLAN: 
1. Discharge Medication List as of 1/26/2019  5:47 PM  
  
START taking these medications Details HYDROcodone-acetaminophen (NORCO) 5-325 mg per tablet Take 1 Tab by mouth every four (4) hours as needed for Pain. Max Daily Amount: 6 Tabs., Print, Disp-20 Tab, R-0  
  
metroNIDAZOLE (FLAGYL) 500 mg tablet Take 1 Tab by mouth three (3) times daily. , Normal, Disp-30 Tab, R-0  
  
ondansetron (ZOFRAN ODT) 4 mg disintegrating tablet Take 1 Tab by mouth every eight (8) hours as needed for Nausea., Normal, Disp-10 Tab, R-0  
  
  
CONTINUE these medications which have NOT CHANGED Details  
!! allopurinol (ZYLOPRIM) 300 mg tablet TAKE 1 TABLET BY MOUTH DAILY FOR GOUT, Normal, Disp-90 Tab, R-0  
  
gabapentin (NEURONTIN) 100 mg capsule TAKE 3 CAPSULES BY MOUTH THREE TIMES DAILY, Normal, Disp-270 Cap, R-0  
  
atenolol (TENORMIN) 50 mg tablet TAKE 1 TABLET BY MOUTH EVERY DAY, Normal, Disp-90 Tab, R-0  
  
lisinopril (PRINIVIL, ZESTRIL) 20 mg tablet TAKE 1 TABLET BY MOUTH DAILY, Normal, Disp-90 Tab, R-3  
  
omeprazole-sodium bicarbonate (ZEGERID) 20-1.1 mg-gram capsule Take 1 Cap by mouth daily. , Historical Med LACTOBACILLUS ACIDOPHILUS (PROBIOTIC PO) Take  by mouth., Historical Med  
  
!! allopurinol (ZYLOPRIM) 300 mg tablet TAKE 1 TABLET BY MOUTH DAILY FOR GOUT, Normal, Disp-90 Tab, R-1  
  
 !! - Potential duplicate medications found. Please discuss with provider. 2.  
Follow-up Information Follow up With Specialties Details Why Contact Info Nathanael Bernheim, DO Internal Medicine In 2 days As needed Radha Clarke [address] [facility] 
[phone] [facility] EMERGENCY DEPT Emergency Medicine  If symptoms worsen [address] 
[phone] Return to ED if worse Diagnosis Clinical Impression: 1. Abdominal pain, LLQ (left lower quadrant) 2. Diverticulosis of colon 3. Nausea and vomiting, intractability of vomiting not specified, unspecified vomiting type 4. Dehydration Attestations: This note is prepared by Tavares Blanco, acting as Scribe for Valentina Robins MD. 
 
The scribe's documentation has been prepared under my direction and personally reviewed by me in its entirety. I confirm that the note above accurately reflects all work, treatment, procedures, and medical decision making performed by me. Valentina Robins MD 
 
 
 
 
This note will not be viewable in [address]

## 2019-01-28 LAB
BACTERIA SPEC CULT: ABNORMAL
CC UR VC: ABNORMAL
SERVICE CMNT-IMP: ABNORMAL

## 2019-09-03 ENCOUNTER — HOSPITAL ENCOUNTER (EMERGENCY)
Age: 54
Discharge: HOME | End: 2019-09-03
Attending: EMERGENCY MEDICINE
Payer: MEDICAID

## 2019-09-03 ENCOUNTER — APPOINTMENT (OUTPATIENT)
Dept: CT IMAGING | Age: 54
End: 2019-09-03
Attending: EMERGENCY MEDICINE
Payer: MEDICAID

## 2019-09-03 VITALS
HEIGHT: 73 IN | WEIGHT: 279.98 LBS | DIASTOLIC BLOOD PRESSURE: 94 MMHG | BODY MASS INDEX: 37.11 KG/M2 | TEMPERATURE: 98.7 F | HEART RATE: 80 BPM | RESPIRATION RATE: 15 BRPM | SYSTOLIC BLOOD PRESSURE: 156 MMHG | OXYGEN SATURATION: 96 %

## 2019-09-03 DIAGNOSIS — R11.2 NON-INTRACTABLE VOMITING WITH NAUSEA, UNSPECIFIED VOMITING TYPE: Primary | ICD-10-CM

## 2019-09-03 LAB
ALBUMIN SERPL-MCNC: 3.7 G/DL (ref 3.5–5)
ALBUMIN/GLOB SERPL: 0.8 {RATIO} (ref 1.1–2.2)
ALP SERPL-CCNC: 86 U/L (ref 45–117)
ALT SERPL-CCNC: 27 U/L (ref 12–78)
ANION GAP SERPL CALC-SCNC: 3 MMOL/L (ref 5–15)
APPEARANCE UR: CLEAR
AST SERPL-CCNC: 18 U/L (ref 15–37)
BACTERIA URNS QL MICRO: NEGATIVE /HPF
BASOPHILS # BLD: 0.1 K/UL (ref 0–0.1)
BASOPHILS NFR BLD: 1 % (ref 0–1)
BILIRUB SERPL-MCNC: 0.5 MG/DL (ref 0.2–1)
BILIRUB UR QL: NEGATIVE
BUN SERPL-MCNC: 26 MG/DL (ref 6–20)
BUN/CREAT SERPL: 24 (ref 12–20)
CALCIUM SERPL-MCNC: 9.6 MG/DL (ref 8.5–10.1)
CHLORIDE SERPL-SCNC: 104 MMOL/L (ref 97–108)
CK SERPL-CCNC: 218 U/L (ref 39–308)
CO2 SERPL-SCNC: 30 MMOL/L (ref 21–32)
COLOR UR: NORMAL
CREAT SERPL-MCNC: 1.09 MG/DL (ref 0.7–1.3)
DIFFERENTIAL METHOD BLD: ABNORMAL
EOSINOPHIL # BLD: 0.3 K/UL (ref 0–0.4)
EOSINOPHIL NFR BLD: 2 % (ref 0–7)
EPITH CASTS URNS QL MICRO: NORMAL /LPF
ERYTHROCYTE [DISTWIDTH] IN BLOOD BY AUTOMATED COUNT: 13.2 % (ref 11.5–14.5)
GLOBULIN SER CALC-MCNC: 4.4 G/DL (ref 2–4)
GLUCOSE SERPL-MCNC: 135 MG/DL (ref 65–100)
GLUCOSE UR STRIP.AUTO-MCNC: NEGATIVE MG/DL
HCT VFR BLD AUTO: 45.7 % (ref 36.6–50.3)
HGB BLD-MCNC: 15.5 G/DL (ref 12.1–17)
HGB UR QL STRIP: NEGATIVE
HYALINE CASTS URNS QL MICRO: NORMAL /LPF (ref 0–5)
IMM GRANULOCYTES # BLD AUTO: 0.1 K/UL (ref 0–0.04)
IMM GRANULOCYTES NFR BLD AUTO: 0 % (ref 0–0.5)
KETONES UR QL STRIP.AUTO: NEGATIVE MG/DL
LEUKOCYTE ESTERASE UR QL STRIP.AUTO: NEGATIVE
LIPASE SERPL-CCNC: 100 U/L (ref 73–393)
LYMPHOCYTES # BLD: 3.2 K/UL (ref 0.8–3.5)
LYMPHOCYTES NFR BLD: 24 % (ref 12–49)
MCH RBC QN AUTO: 32 PG (ref 26–34)
MCHC RBC AUTO-ENTMCNC: 33.9 G/DL (ref 30–36.5)
MCV RBC AUTO: 94.4 FL (ref 80–99)
MONOCYTES # BLD: 1.2 K/UL (ref 0–1)
MONOCYTES NFR BLD: 9 % (ref 5–13)
NEUTS SEG # BLD: 8.6 K/UL (ref 1.8–8)
NEUTS SEG NFR BLD: 64 % (ref 32–75)
NITRITE UR QL STRIP.AUTO: NEGATIVE
NRBC # BLD: 0 K/UL (ref 0–0.01)
NRBC BLD-RTO: 0 PER 100 WBC
PH UR STRIP: 5.5 [PH] (ref 5–8)
PLATELET # BLD AUTO: 362 K/UL (ref 150–400)
PMV BLD AUTO: 9.8 FL (ref 8.9–12.9)
POTASSIUM SERPL-SCNC: 4.6 MMOL/L (ref 3.5–5.1)
PROT SERPL-MCNC: 8.1 G/DL (ref 6.4–8.2)
PROT UR STRIP-MCNC: NEGATIVE MG/DL
RBC # BLD AUTO: 4.84 M/UL (ref 4.1–5.7)
RBC #/AREA URNS HPF: NORMAL /HPF (ref 0–5)
SODIUM SERPL-SCNC: 137 MMOL/L (ref 136–145)
SP GR UR REFRACTOMETRY: 1.02 (ref 1–1.03)
TROPONIN I SERPL-MCNC: <0.05 NG/ML
UROBILINOGEN UR QL STRIP.AUTO: 0.2 EU/DL (ref 0.2–1)
WBC # BLD AUTO: 13.3 K/UL (ref 4.1–11.1)
WBC URNS QL MICRO: NORMAL /HPF (ref 0–4)

## 2019-09-03 PROCEDURE — 80053 COMPREHEN METABOLIC PANEL: CPT

## 2019-09-03 PROCEDURE — 93005 ELECTROCARDIOGRAM TRACING: CPT

## 2019-09-03 PROCEDURE — 96374 THER/PROPH/DIAG INJ IV PUSH: CPT

## 2019-09-03 PROCEDURE — 99283 EMERGENCY DEPT VISIT LOW MDM: CPT

## 2019-09-03 PROCEDURE — 36415 COLL VENOUS BLD VENIPUNCTURE: CPT

## 2019-09-03 PROCEDURE — 96375 TX/PRO/DX INJ NEW DRUG ADDON: CPT

## 2019-09-03 PROCEDURE — 83690 ASSAY OF LIPASE: CPT

## 2019-09-03 PROCEDURE — 74177 CT ABD & PELVIS W/CONTRAST: CPT

## 2019-09-03 PROCEDURE — 96361 HYDRATE IV INFUSION ADD-ON: CPT

## 2019-09-03 PROCEDURE — 85025 COMPLETE CBC W/AUTO DIFF WBC: CPT

## 2019-09-03 PROCEDURE — 84484 ASSAY OF TROPONIN QUANT: CPT

## 2019-09-03 PROCEDURE — 74011636320 HC RX REV CODE- 636/320: Performed by: EMERGENCY MEDICINE

## 2019-09-03 PROCEDURE — 74011250636 HC RX REV CODE- 250/636: Performed by: EMERGENCY MEDICINE

## 2019-09-03 PROCEDURE — 81001 URINALYSIS AUTO W/SCOPE: CPT

## 2019-09-03 PROCEDURE — 82550 ASSAY OF CK (CPK): CPT

## 2019-09-03 RX ORDER — ONDANSETRON 4 MG/1
4 TABLET, FILM COATED ORAL
Qty: 21 TAB | Refills: 0 | Status: SHIPPED | OUTPATIENT
Start: 2019-09-03 | End: 2019-09-10

## 2019-09-03 RX ORDER — ONDANSETRON 2 MG/ML
4 INJECTION INTRAMUSCULAR; INTRAVENOUS
Status: COMPLETED | OUTPATIENT
Start: 2019-09-03 | End: 2019-09-03

## 2019-09-03 RX ORDER — SODIUM CHLORIDE 0.9 % (FLUSH) 0.9 %
10 SYRINGE (ML) INJECTION
Status: COMPLETED | OUTPATIENT
Start: 2019-09-03 | End: 2019-09-03

## 2019-09-03 RX ADMIN — IOPAMIDOL 100 ML: 755 INJECTION, SOLUTION INTRAVENOUS at 21:15

## 2019-09-03 RX ADMIN — SODIUM CHLORIDE 1000 ML: 900 INJECTION, SOLUTION INTRAVENOUS at 20:39

## 2019-09-03 RX ADMIN — Medication 10 ML: at 21:15

## 2019-09-03 RX ADMIN — FAMOTIDINE 20 MG: 10 INJECTION, SOLUTION INTRAVENOUS at 20:19

## 2019-09-03 RX ADMIN — ONDANSETRON 4 MG: 2 INJECTION INTRAMUSCULAR; INTRAVENOUS at 20:19

## 2019-09-03 NOTE — ED PROVIDER NOTES
EMERGENCY DEPARTMENT HISTORY AND PHYSICAL EXAM      Date: 9/3/2019  Patient Name: Contreras Watts  Patient Age and Sex: 46 y.o. male     History of Presenting Illness     Chief Complaint   Patient presents with    Abdominal Pain     Ambulatory into the ED with c/o nausea and epigastric abdominal pain x 8/30    Vomiting       History Provided By: Patient    HPI: Contreras Watts  Has medical history of gastritis presenting today with abdominal pain and vomiting. Patient reports that he started having symptoms on Friday afternoon. He reports multiple episodes of nonbloody nonbilious emesis. He also states he is having loose stool and abdominal pain. He states that his discomfort initially was in the lower abdomen and now is in the epigastric region. He denies any significant alcohol use. Denies prior history of abdominal surgery. No fevers at home. There are no other complaints, changes, or physical findings at this time. PCP: Barbara Aden, DO    No current facility-administered medications on file prior to encounter. Current Outpatient Medications on File Prior to Encounter   Medication Sig Dispense Refill    HYDROcodone-acetaminophen (NORCO) 5-325 mg per tablet Take 1 Tab by mouth every four (4) hours as needed for Pain. Max Daily Amount: 6 Tabs. 20 Tab 0    metroNIDAZOLE (FLAGYL) 500 mg tablet Take 1 Tab by mouth three (3) times daily. 30 Tab 0    ondansetron (ZOFRAN ODT) 4 mg disintegrating tablet Take 1 Tab by mouth every eight (8) hours as needed for Nausea.  10 Tab 0    allopurinol (ZYLOPRIM) 300 mg tablet TAKE 1 TABLET BY MOUTH DAILY FOR GOUT 90 Tab 0    gabapentin (NEURONTIN) 100 mg capsule TAKE 3 CAPSULES BY MOUTH THREE TIMES DAILY 270 Cap 0    atenolol (TENORMIN) 50 mg tablet TAKE 1 TABLET BY MOUTH EVERY DAY 90 Tab 0    lisinopril (PRINIVIL, ZESTRIL) 20 mg tablet TAKE 1 TABLET BY MOUTH DAILY 90 Tab 3    omeprazole-sodium bicarbonate (ZEGERID) 20-1.1 mg-gram capsule Take 1 Cap by mouth daily.  LACTOBACILLUS ACIDOPHILUS (PROBIOTIC PO) Take  by mouth.  allopurinol (ZYLOPRIM) 300 mg tablet TAKE 1 TABLET BY MOUTH DAILY FOR GOUT [address] Tab 1       Past History     Past Medical History:  Past Medical History:   Diagnosis Date    Anxiety     Arthritis     back /gout    Asthma     Pt denies any sx currently 12/2/15    Chronic pain     Rt knee; has had cortisone shots    Diverticulitis     diverticulosis, pancreatitis    GERD (gastroesophageal reflux disease)     Gout     Right and left toes:  on preventative Rx so denies any problems    History of kidney stones     x1    Hypertension     MRSA (methicillin resistant staph aureus) culture positive 9/8/15    nares, treated with ABX    Psychiatric disorder     anxiety       Past Surgical History:  Past Surgical History:   Procedure Laterality Date    HX CERVICAL FUSION  9/22/15    C4-7    HX COLONOSCOPY      HX ORTHOPAEDIC      right knee arthroscopy and cyst removed       Family History:  Family History   Problem Relation Age of Onset    Cancer Mother         CA COLON    Hypertension Mother     Hypertension Father     Kidney Disease Father        Social History:  Social History     Tobacco Use    Smoking status: Former Smoker     Packs/day: 0.50     Years: 32.00     Pack years: 16.00     Last attempt to quit: 2009     Years since quittin.7    Smokeless tobacco: Never Used    Tobacco comment: Quit 2 yrs ago   Substance Use Topics    Alcohol use: No     Alcohol/week: 0.0 standard drinks    Drug use: No       Allergies:  No Known Allergies      Review of Systems   Constitutional: No  fever,  No  headache  Skin: No  rash, No  jaundice  HEENT: No  nasal congestion, No  eye drainage.    Resp: No cough,  No  wheezing  CV: No chest pain, No  palpitations  GI: + vomiting,  +  diarrhea.,  No  constipation  : No dysuria,  No  hematuria  MSK: No joint pain,  No  trauma  Neuro: No numbness, No  tingling  Psych: No suicidal, No  paranoid      Physical Exam     Patient Vitals for the past 12 hrs:   Temp Pulse Resp BP SpO2   09/03/19 1831 98.7 °F (37.1 °C) 80 15 (!) 155/106 98 %     General: alert, No acute distress  Eyes: EOMI, normal conjunctiva  ENT: dry mucous membranes. Neck: Active, full ROM of neck. Skin: No rashes. no jaundice              Lungs: Equal chest expansion. no respiratory distress. clear to auscultation bilaterally No accessory muscle usage  Heart: regular rate     no peripheral edema   2+ radial pulses and DPs bilaterally  Abd:  non distended soft, nontender. No rebound tenderness. No guarding  Back: Full ROM  MSK: Full, active ROM in all 4 extremities.    Neuro: Person, Place, Time and Situation; normal speech;   Psych: Cooperative with exam; Appropriate mood and affect             Diagnostic Study Results     Labs -     Recent Results (from the past 12 hour(s))   EKG, 12 LEAD, INITIAL    Collection Time: 09/03/19  6:35 PM   Result Value Ref Range    Ventricular Rate 77 BPM    Atrial Rate 77 BPM    P-R Interval 154 ms    QRS Duration 78 ms    Q-T Interval 374 ms    QTC Calculation (Bezet) 423 ms    Calculated P Axis 60 degrees    Calculated R Axis 30 degrees    Calculated T Axis 36 degrees    Diagnosis       Normal sinus rhythm  Nonspecific ST abnormality  When compared with ECG of 28-JUL-2017 21:34,  Non-specific change in ST segment in Anterior leads     URINALYSIS W/MICROSCOPIC    Collection Time: 09/03/19  6:45 PM   Result Value Ref Range    Color YELLOW/STRAW      Appearance CLEAR CLEAR      Specific gravity 1.023 1.003 - 1.030      pH (UA) 5.5 5.0 - 8.0      Protein NEGATIVE  NEG mg/dL    Glucose NEGATIVE  NEG mg/dL    Ketone NEGATIVE  NEG mg/dL    Bilirubin NEGATIVE  NEG      Blood NEGATIVE  NEG      Urobilinogen 0.2 0.2 - 1.0 EU/dL    Nitrites NEGATIVE  NEG      Leukocyte Esterase NEGATIVE  NEG      WBC 0-4 0 - 4 /hpf    RBC 0-5 0 - 5 /hpf    Epithelial cells FEW FEW /lpf    Bacteria NEGATIVE  NEG /hpf    Hyaline cast 0-2 0 - 5 /lpf   CBC WITH AUTOMATED DIFF    Collection Time: 09/03/19  7:01 PM   Result Value Ref Range    WBC 13.3 (H) 4.1 - 11.1 K/uL    RBC 4.84 4.10 - 5.70 M/uL    HGB 15.5 12.1 - 17.0 g/dL    HCT 45.7 36.6 - 50.3 %    MCV 94.4 80.0 - 99.0 FL    MCH 32.0 26.0 - 34.0 PG    MCHC 33.9 30.0 - 36.5 g/dL    RDW 13.2 11.5 - 14.5 %    PLATELET 033 470 - 758 K/uL    MPV 9.8 8.9 - 12.9 FL    NRBC 0.0 0  WBC    ABSOLUTE NRBC 0.00 0.00 - 0.01 K/uL    NEUTROPHILS 64 32 - 75 %    LYMPHOCYTES 24 12 - 49 %    MONOCYTES 9 5 - 13 %    EOSINOPHILS 2 0 - 7 %    BASOPHILS 1 0 - 1 %    IMMATURE GRANULOCYTES 0 0.0 - 0.5 %    ABS. NEUTROPHILS 8.6 (H) 1.8 - 8.0 K/UL    ABS. LYMPHOCYTES 3.2 0.8 - 3.5 K/UL    ABS. MONOCYTES 1.2 (H) 0.0 - 1.0 K/UL    ABS. EOSINOPHILS 0.3 0.0 - 0.4 K/UL    ABS. BASOPHILS 0.1 0.0 - 0.1 K/UL    ABS. IMM. GRANS. 0.1 (H) 0.00 - 0.04 K/UL    DF AUTOMATED     METABOLIC PANEL, COMPREHENSIVE    Collection Time: 09/03/19  7:01 PM   Result Value Ref Range    Sodium 137 136 - 145 mmol/L    Potassium 4.6 3.5 - 5.1 mmol/L    Chloride 104 97 - 108 mmol/L    CO2 30 21 - 32 mmol/L    Anion gap 3 (L) 5 - 15 mmol/L    Glucose 135 (H) 65 - 100 mg/dL    BUN 26 (H) 6 - 20 MG/DL    Creatinine 1.09 0.70 - 1.30 MG/DL    BUN/Creatinine ratio 24 (H) 12 - 20      GFR est AA >60 >60 ml/min/1.73m2    GFR est non-AA >60 >60 ml/min/1.73m2    Calcium 9.6 8.5 - 10.1 MG/DL    Bilirubin, total 0.5 0.2 - 1.0 MG/DL    ALT (SGPT) 27 12 - 78 U/L    AST (SGOT) 18 15 - 37 U/L    Alk.  phosphatase 86 45 - 117 U/L    Protein, total 8.1 6.4 - 8.2 g/dL    Albumin 3.7 3.5 - 5.0 g/dL    Globulin 4.4 (H) 2.0 - 4.0 g/dL    A-G Ratio 0.8 (L) 1.1 - 2.2     TROPONIN I    Collection Time: 09/03/19  7:01 PM   Result Value Ref Range    Troponin-I, Qt. <0.05 <0.05 ng/mL   CK W/ REFLX CKMB    Collection Time: 09/03/19  7:01 PM   Result Value Ref Range     39 - 308 U/L   LIPASE    Collection Time: 09/03/19  7:01 PM   Result Value Ref Range    Lipase 100 73 - 393 U/L       Radiologic Studies -   CT ABD PELV W CONT   Final Result   IMPRESSION: Normal appendix. CT Results  (Last 48 hours)               09/03/19 2115  CT ABD PELV W CONT Final result    Impression:  IMPRESSION: Normal appendix. Narrative:  INDICATION: Abdominal pain, right lower quadrant pain. Nausea and epigastric   pain since August 30. CT of the abdomen and pelvis is performed with 5 mm collimation. Study is   performed with 100 cc of nonionic Isovue 370. Sagittal and coronal reformatted   images were also performed. CT dose reduction was achieved with the use of the standardized protocol   tailored for this examination and automatic exposure control for dose   modulation. Direct comparison is made to prior CT dated January 2019. Findings:       Lung bases: The visualized lung bases are clear. Liver: There is diffuse fatty infiltration of the liver. Adrenals: Adrenal glands are normal.       Pancreas: The pancreas is normal.       Gallbladder: The gallbladder is normal.       Kidneys: The kidneys are normal.       Spleen: The spleen is normal.       Lymph nodes. There is no moe hepatitis, mesenteric, retroperitoneal or pelvic   lymphadenopathy. Bowel: No thickened or dilated loop of large or small bowel is visualized. Scattered colonic diverticulosis is noted. Appendix: The appendix is normal.       Urinary bladder: Urinary bladder is partially filled and grossly normal.       Miscellaneous: There is no free intraperitoneal fluid or gas. There is no focal   fluid collection to suggest abscess.                CXR Results  (Last 48 hours)    None            Medical Decision Making     Differential Diagnosis: Pancreatitis, gastroenteritis, hepatitis, diverticulitis    I reviewed the vital signs, available nursing notes, past medical history, past surgical history, family history and social history and old medical records. On my interpretation, Laboratory workup is significant for lipase is negative, slightly elevated white blood cell count at 13.3, urinalysis is negative for any signs of infection, troponin is negative  On my interpretation of the radiology studies CT of the abdomen and pelvis without any evidence of diverticulitis or appendicitis  On my interpretation of the EKG the patient has normal sinus rhythm without any evidence of ST elevation or depression, ventricular rate is 77, QTc 423    Management/ED course: Patient presented today with vomiting and loose stools over the past several days and abdominal pain. No evidence of surgical pathology, and laboratory work-up is largely unremarkable. He was treated with IV fluids and IV antiemetics with some significant relief of his symptoms. The patient subsequently discharged with plans to follow-up with primary care provider. Dispo: Discharged. The patient has been re-evaluated and is ready for discharge. Reviewed available results with patient. Counseled patient on diagnosis and care plan. Patient has expressed understanding, and all questions have been answered. Patient agrees with plan and agrees to follow up as recommended, or to return to the ED if their symptoms worsen. Discharge instructions have been provided and explained to the patient, along with reasons to return to the ED. PLAN:  Current Discharge Medication List      START taking these medications    Details   ondansetron hcl (ZOFRAN) 4 mg tablet Take 1 Tab by mouth every eight (8) hours as needed for Nausea for up to 7 days. Qty: 21 Tab, Refills: 0           2. Follow-up Information     Follow up With Specialties Details Why Contact Info    Larissa Mcwilliams, DO Internal Medicine  As needed [address] [facility] [address]. [phone]          3. Return to ED if worse     Diagnosis     Clinical Impression:   1.  Non-intractable vomiting with nausea, unspecified vomiting type        Attestations:    Musa Grande MD

## 2019-09-04 LAB
ATRIAL RATE: 77 BPM
CALCULATED P AXIS, ECG09: 60 DEGREES
CALCULATED R AXIS, ECG10: 30 DEGREES
CALCULATED T AXIS, ECG11: 36 DEGREES
DIAGNOSIS, 93000: NORMAL
P-R INTERVAL, ECG05: 154 MS
Q-T INTERVAL, ECG07: 374 MS
QRS DURATION, ECG06: 78 MS
QTC CALCULATION (BEZET), ECG08: 423 MS
VENTRICULAR RATE, ECG03: 77 BPM

## 2019-11-11 ENCOUNTER — HOSPITAL ENCOUNTER (INPATIENT)
Age: 54
LOS: 4 days | Discharge: HOME | DRG: 751 | End: 2019-11-15
Attending: EMERGENCY MEDICINE | Admitting: PSYCHIATRY & NEUROLOGY
Payer: COMMERCIAL

## 2019-11-11 DIAGNOSIS — R45.851 SUICIDAL IDEATION: Primary | ICD-10-CM

## 2019-11-11 PROBLEM — F32.9 MAJOR DEPRESSIVE DISORDER: Status: ACTIVE | Noted: 2019-11-11

## 2019-11-11 LAB
ALBUMIN SERPL-MCNC: 4.2 G/DL (ref 3.5–5)
ALBUMIN/GLOB SERPL: 0.9 {RATIO} (ref 1.1–2.2)
ALP SERPL-CCNC: 95 U/L (ref 45–117)
ALT SERPL-CCNC: 26 U/L (ref 12–78)
AMPHET UR QL SCN: POSITIVE
ANION GAP SERPL CALC-SCNC: 5 MMOL/L (ref 5–15)
APAP SERPL-MCNC: <2 UG/ML (ref 10–30)
APPEARANCE UR: CLEAR
AST SERPL-CCNC: 15 U/L (ref 15–37)
BARBITURATES UR QL SCN: NEGATIVE
BASOPHILS # BLD: 0.1 K/UL (ref 0–0.1)
BASOPHILS NFR BLD: 1 % (ref 0–1)
BENZODIAZ UR QL: NEGATIVE
BILIRUB SERPL-MCNC: 0.5 MG/DL (ref 0.2–1)
BILIRUB UR QL: NEGATIVE
BUN SERPL-MCNC: 37 MG/DL (ref 6–20)
BUN/CREAT SERPL: 30 (ref 12–20)
CALCIUM SERPL-MCNC: 9.9 MG/DL (ref 8.5–10.1)
CANNABINOIDS UR QL SCN: POSITIVE
CHLORIDE SERPL-SCNC: 101 MMOL/L (ref 97–108)
CO2 SERPL-SCNC: 30 MMOL/L (ref 21–32)
COCAINE UR QL SCN: NEGATIVE
COLOR UR: ABNORMAL
CREAT SERPL-MCNC: 1.22 MG/DL (ref 0.7–1.3)
DIFFERENTIAL METHOD BLD: ABNORMAL
DRUG SCRN COMMENT,DRGCM: ABNORMAL
EOSINOPHIL # BLD: 0.2 K/UL (ref 0–0.4)
EOSINOPHIL NFR BLD: 1 % (ref 0–7)
ERYTHROCYTE [DISTWIDTH] IN BLOOD BY AUTOMATED COUNT: 13.5 % (ref 11.5–14.5)
ETHANOL SERPL-MCNC: <10 MG/DL
GLOBULIN SER CALC-MCNC: 4.5 G/DL (ref 2–4)
GLUCOSE SERPL-MCNC: 143 MG/DL (ref 65–100)
GLUCOSE UR STRIP.AUTO-MCNC: NEGATIVE MG/DL
HCT VFR BLD AUTO: 49.3 % (ref 36.6–50.3)
HGB BLD-MCNC: 15.6 G/DL (ref 12.1–17)
HGB UR QL STRIP: NEGATIVE
IMM GRANULOCYTES # BLD AUTO: 0.1 K/UL (ref 0–0.04)
IMM GRANULOCYTES NFR BLD AUTO: 1 % (ref 0–0.5)
KETONES UR QL STRIP.AUTO: NEGATIVE MG/DL
LEUKOCYTE ESTERASE UR QL STRIP.AUTO: NEGATIVE
LYMPHOCYTES # BLD: 4.6 K/UL (ref 0.8–3.5)
LYMPHOCYTES NFR BLD: 24 % (ref 12–49)
MCH RBC QN AUTO: 31 PG (ref 26–34)
MCHC RBC AUTO-ENTMCNC: 31.6 G/DL (ref 30–36.5)
MCV RBC AUTO: 97.8 FL (ref 80–99)
METHADONE UR QL: NEGATIVE
MONOCYTES # BLD: 1.8 K/UL (ref 0–1)
MONOCYTES NFR BLD: 9 % (ref 5–13)
NEUTS SEG # BLD: 12.2 K/UL (ref 1.8–8)
NEUTS SEG NFR BLD: 64 % (ref 32–75)
NITRITE UR QL STRIP.AUTO: NEGATIVE
NRBC # BLD: 0 K/UL (ref 0–0.01)
NRBC BLD-RTO: 0 PER 100 WBC
OPIATES UR QL: POSITIVE
PCP UR QL: NEGATIVE
PH UR STRIP: 5.5 [PH] (ref 5–8)
PLATELET # BLD AUTO: 437 K/UL (ref 150–400)
PMV BLD AUTO: 10.3 FL (ref 8.9–12.9)
POTASSIUM SERPL-SCNC: 4 MMOL/L (ref 3.5–5.1)
PROT SERPL-MCNC: 8.7 G/DL (ref 6.4–8.2)
PROT UR STRIP-MCNC: NEGATIVE MG/DL
RBC # BLD AUTO: 5.04 M/UL (ref 4.1–5.7)
SALICYLATES SERPL-MCNC: 2.1 MG/DL (ref 2.8–20)
SODIUM SERPL-SCNC: 136 MMOL/L (ref 136–145)
SP GR UR REFRACTOMETRY: >1.03 (ref 1–1.03)
UROBILINOGEN UR QL STRIP.AUTO: 0.2 EU/DL (ref 0.2–1)
WBC # BLD AUTO: 19 K/UL (ref 4.1–11.1)

## 2019-11-11 PROCEDURE — 36415 COLL VENOUS BLD VENIPUNCTURE: CPT

## 2019-11-11 PROCEDURE — 80053 COMPREHEN METABOLIC PANEL: CPT

## 2019-11-11 PROCEDURE — 85025 COMPLETE CBC W/AUTO DIFF WBC: CPT

## 2019-11-11 PROCEDURE — 80307 DRUG TEST PRSMV CHEM ANLYZR: CPT

## 2019-11-11 PROCEDURE — 99284 EMERGENCY DEPT VISIT MOD MDM: CPT

## 2019-11-11 PROCEDURE — 74011250637 HC RX REV CODE- 250/637: Performed by: EMERGENCY MEDICINE

## 2019-11-11 PROCEDURE — 74011250637 HC RX REV CODE- 250/637: Performed by: PSYCHIATRY & NEUROLOGY

## 2019-11-11 PROCEDURE — 65220000003 HC RM SEMIPRIVATE PSYCH

## 2019-11-11 PROCEDURE — 81003 URINALYSIS AUTO W/O SCOPE: CPT

## 2019-11-11 RX ORDER — TRAZODONE HYDROCHLORIDE 50 MG/1
50 TABLET ORAL
Status: DISCONTINUED | OUTPATIENT
Start: 2019-11-11 | End: 2019-11-14

## 2019-11-11 RX ORDER — LISINOPRIL 20 MG/1
20 TABLET ORAL DAILY
Status: DISCONTINUED | OUTPATIENT
Start: 2019-11-12 | End: 2019-11-14

## 2019-11-11 RX ORDER — HALOPERIDOL 5 MG/ML
5 INJECTION INTRAMUSCULAR
Status: DISCONTINUED | OUTPATIENT
Start: 2019-11-11 | End: 2019-11-15 | Stop reason: HOSPADM

## 2019-11-11 RX ORDER — OLANZAPINE 5 MG/1
5 TABLET ORAL
Status: DISCONTINUED | OUTPATIENT
Start: 2019-11-11 | End: 2019-11-15 | Stop reason: HOSPADM

## 2019-11-11 RX ORDER — ADHESIVE BANDAGE
30 BANDAGE TOPICAL DAILY PRN
Status: DISCONTINUED | OUTPATIENT
Start: 2019-11-11 | End: 2019-11-15 | Stop reason: HOSPADM

## 2019-11-11 RX ORDER — HYDROXYZINE 50 MG/1
50 TABLET, FILM COATED ORAL
Status: DISCONTINUED | OUTPATIENT
Start: 2019-11-11 | End: 2019-11-15 | Stop reason: HOSPADM

## 2019-11-11 RX ORDER — ACETAMINOPHEN 325 MG/1
650 TABLET ORAL
Status: DISCONTINUED | OUTPATIENT
Start: 2019-11-11 | End: 2019-11-15 | Stop reason: HOSPADM

## 2019-11-11 RX ORDER — BUPROPION HYDROCHLORIDE 150 MG/1
150 TABLET ORAL
COMMUNITY
End: 2019-11-15

## 2019-11-11 RX ORDER — LORAZEPAM 1 MG/1
1 TABLET ORAL
Status: COMPLETED | OUTPATIENT
Start: 2019-11-11 | End: 2019-11-11

## 2019-11-11 RX ORDER — DIPHENHYDRAMINE HYDROCHLORIDE 50 MG/ML
50 INJECTION, SOLUTION INTRAMUSCULAR; INTRAVENOUS
Status: DISCONTINUED | OUTPATIENT
Start: 2019-11-11 | End: 2019-11-15 | Stop reason: HOSPADM

## 2019-11-11 RX ORDER — BENZTROPINE MESYLATE 1 MG/1
1 TABLET ORAL
Status: DISCONTINUED | OUTPATIENT
Start: 2019-11-11 | End: 2019-11-15 | Stop reason: HOSPADM

## 2019-11-11 RX ORDER — ATENOLOL 25 MG/1
25 TABLET ORAL DAILY
Status: DISCONTINUED | OUTPATIENT
Start: 2019-11-11 | End: 2019-11-12

## 2019-11-11 RX ORDER — ATENOLOL 25 MG/1
25 TABLET ORAL
COMMUNITY

## 2019-11-11 RX ORDER — LORAZEPAM 2 MG/ML
1 INJECTION INTRAMUSCULAR
Status: DISCONTINUED | OUTPATIENT
Start: 2019-11-11 | End: 2019-11-15 | Stop reason: HOSPADM

## 2019-11-11 RX ORDER — CLONIDINE HYDROCHLORIDE 0.1 MG/1
0.1 TABLET ORAL
COMMUNITY
End: 2019-11-15

## 2019-11-11 RX ADMIN — TRAZODONE HYDROCHLORIDE 50 MG: 50 TABLET ORAL at 21:58

## 2019-11-11 RX ADMIN — LORAZEPAM 1 MG: 1 TABLET ORAL at 14:15

## 2019-11-11 RX ADMIN — ATENOLOL 25 MG: 25 TABLET ORAL at 18:10

## 2019-11-11 NOTE — PROGRESS NOTES
Admission Medication Reconciliation:    Information obtained from:  Patient, family member,  Tarun Morgan    Comments/Recommendations: Updated PTA meds/reviewed patient's allergies. Medication changes (since last review): Added  - bupropion, clonidine    Adjusted  - atenolol 50mg to 25mg    Removed  - gabapentin, norco, zofran     ¹[employer] pharmacy benefit data reflects medications filled and processed through the patient's insurance, however   this data does NOT capture whether the medication was picked up or is currently being taken by the patient. Allergies:  Patient has no known allergies. Significant PMH/Disease States:   Past Medical History:   Diagnosis Date    Anxiety     Arthritis     back /gout    Asthma     Pt denies any sx currently 12/2/15    Chronic pain     Rt knee; has had cortisone shots    Diverticulitis     diverticulosis, pancreatitis    GERD (gastroesophageal reflux disease)     Gout     Right and left toes:  on preventative Rx so denies any problems    History of kidney stones     x1    Hypertension     MRSA (methicillin resistant staph aureus) culture positive 9/8/15    nares, treated with ABX    Psychiatric disorder     anxiety     Chief Complaint for this Admission:    Chief Complaint   Patient presents with    Mental Health Problem     Prior to Admission Medications:   Prior to Admission Medications   Prescriptions Last Dose Informant Patient Reported? Taking? LACTOBACILLUS ACIDOPHILUS (PROBIOTIC PO) 11/11/2019 at Unknown time  Yes Yes   Sig: Take 1 Tab by mouth daily. allopurinol (ZYLOPRIM) 300 mg tablet 11/11/2019 at Unknown time  No Yes   Sig: TAKE 1 TABLET BY MOUTH DAILY FOR GOUT   atenolol (TENORMIN) 25 mg tablet 11/10/2019 at Unknown time  Yes Yes   Sig: Take 25 mg by mouth nightly. buPROPion XL (WELLBUTRIN XL) 150 mg tablet 11/10/2019 at Unknown time  Yes Yes   Sig: Take 150 mg by mouth every morning.    cloNIDine HCl (CATAPRES) 0.1 mg tablet   Yes Yes   Sig: Take 0.1 mg by mouth daily as needed. lisinopril (PRINIVIL, ZESTRIL) 20 mg tablet 11/11/2019 at Unknown time  No Yes   Sig: TAKE 1 TABLET BY MOUTH DAILY   omeprazole-sodium bicarbonate (ZEGERID) 20-1.1 mg-gram capsule 11/11/2019 at Unknown time  Yes Yes   Sig: Take 1 Cap by mouth daily.       Facility-Administered Medications: None

## 2019-11-11 NOTE — BH NOTES
TRANSFER - IN REPORT:    Verbal report received from Martha Jones, [address] [facility] on Linn Delgado  being received from [facility] ED for routine progression of care      Report consisted of patients Situation, Background, Assessment and   Recommendations(SBAR). Information from the following report(s) SBAR was reviewed with the receiving nurse. Opportunity for questions and clarification was provided. Assessment completed upon patients arrival to unit and care assumed.
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
## 2019-11-11 NOTE — BH NOTES
Pt ambulatory to [address] [place] [address] upon admission. Dual skin assessment done with Royer San RN. No impairment noted, scars from previous surgeries (R. Knee, Anterior neck)    Pt calm upon admission, slightly agitated when told his PTA medications could not be administered until orders from MD were received. Pt was offered education and he accepted and expressed understanding. Pt  denies current SI/HI, agrees to seek staff if feelings/thoughts become intense, too much to handle. Pt has previous positive history of MRSA. Room blocked for this reason. Swab completed and sent to lab, pending results. Pt's belongings checked, charted and secured by Roxy Howard.

## 2019-11-11 NOTE — ED NOTES
Pt ambulatory to the restroom. Settled back into bed, sitting upright. Meal tray ordered. 3:48 PM  Pt's visitor brought bag of belongings from home including home medications and clothing. Belongings removed from patient and kept secure at the nursing station. 4:20 PM  Pt eating dinner.

## 2019-11-11 NOTE — ED NOTES
Pt reports onset of increasing anxiety. Updated on status. Dr. Chris Clement notified. See new orders.
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
## 2019-11-11 NOTE — BSMART NOTE
Boone Recinos has been accepted to [phone]. Nurse can call report to . [address] [phone]

## 2019-11-11 NOTE — ED NOTES
Pt contracts to safety in the ED, all clothing and belongings removed from patient, patient changed into a gown. Pt visible from nursing station with door and blinds open.
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
## 2019-11-11 NOTE — ROUTINE PROCESS
TRANSFER - OUT REPORT: 
 
Verbal report given to Chelsey Elliott RN(name) on Radha Rutledge  being transferred to [address] [place] [address](unit) for routine progression of care Report consisted of patients Situation, Background, Assessment and  
Recommendations(SBAR). Information from the following report(s) SBAR, ED Summary, Intake/Output, MAR and Recent Results was reviewed with the receiving nurse. Lines:    
 
Opportunity for questions and clarification was provided. Patient transported with: 
 [employer]

## 2019-11-12 LAB
BACTERIA SPEC CULT: NORMAL
BACTERIA SPEC CULT: NORMAL
CHOLEST SERPL-MCNC: 170 MG/DL
GLUCOSE P FAST SERPL-MCNC: 97 MG/DL (ref 65–100)
HDLC SERPL-MCNC: 32 MG/DL
HDLC SERPL: 5.3 {RATIO} (ref 0–5)
LDLC SERPL CALC-MCNC: 106 MG/DL (ref 0–100)
LIPID PROFILE,FLP: ABNORMAL
SERVICE CMNT-IMP: NORMAL
TRIGL SERPL-MCNC: 160 MG/DL (ref ?–150)
TSH SERPL DL<=0.05 MIU/L-ACNC: 1.21 UIU/ML (ref 0.36–3.74)
VLDLC SERPL CALC-MCNC: 32 MG/DL

## 2019-11-12 PROCEDURE — 80061 LIPID PANEL: CPT

## 2019-11-12 PROCEDURE — 82947 ASSAY GLUCOSE BLOOD QUANT: CPT

## 2019-11-12 PROCEDURE — 84443 ASSAY THYROID STIM HORMONE: CPT

## 2019-11-12 PROCEDURE — 36415 COLL VENOUS BLD VENIPUNCTURE: CPT

## 2019-11-12 PROCEDURE — 65220000003 HC RM SEMIPRIVATE PSYCH

## 2019-11-12 PROCEDURE — 74011250637 HC RX REV CODE- 250/637: Performed by: PSYCHIATRY & NEUROLOGY

## 2019-11-12 PROCEDURE — 74011250637 HC RX REV CODE- 250/637: Performed by: NURSE PRACTITIONER

## 2019-11-12 RX ORDER — DULOXETIN HYDROCHLORIDE 30 MG/1
30 CAPSULE, DELAYED RELEASE ORAL DAILY
Status: DISCONTINUED | OUTPATIENT
Start: 2019-11-12 | End: 2019-11-14

## 2019-11-12 RX ORDER — ATENOLOL 25 MG/1
25 TABLET ORAL EVERY EVENING
Status: DISCONTINUED | OUTPATIENT
Start: 2019-11-12 | End: 2019-11-15 | Stop reason: HOSPADM

## 2019-11-12 RX ORDER — ALLOPURINOL 100 MG/1
300 TABLET ORAL DAILY
Status: DISCONTINUED | OUTPATIENT
Start: 2019-11-13 | End: 2019-11-15 | Stop reason: HOSPADM

## 2019-11-12 RX ORDER — PANTOPRAZOLE SODIUM 40 MG/1
40 TABLET, DELAYED RELEASE ORAL
Status: DISCONTINUED | OUTPATIENT
Start: 2019-11-13 | End: 2019-11-15 | Stop reason: HOSPADM

## 2019-11-12 RX ADMIN — DULOXETINE HYDROCHLORIDE 30 MG: 30 CAPSULE, DELAYED RELEASE ORAL at 12:53

## 2019-11-12 RX ADMIN — HYDROXYZINE HYDROCHLORIDE 50 MG: 50 TABLET, FILM COATED ORAL at 18:20

## 2019-11-12 RX ADMIN — ATENOLOL 25 MG: 25 TABLET ORAL at 18:19

## 2019-11-12 RX ADMIN — LISINOPRIL 20 MG: 20 TABLET ORAL at 08:33

## 2019-11-12 NOTE — PROGRESS NOTES
Patient participated in [address] [place] [address] on 11/12/2019. Visited by Rev. Jerman Miles MDiv, [facility], [address] [facility] paging service: [phone])
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
## 2019-11-12 NOTE — PROGRESS NOTES
Problem: Discharge Planning  Goal: *Discharge to safe environment  Outcome: Progressing Towards Goal  Note:   Patient identifies home as a safe environment and plans to return home upon discharge. Goal: *Knowledge of medication management  Outcome: Progressing Towards Goal  Note:   Patient agrees to take medications as prescribe. Goal: *Knowledge of discharge instructions  Outcome: Progressing Towards Goal  Note:   Patient verbalized understanding of goals for treatment and safe discharge.

## 2019-11-12 NOTE — INTERDISCIPLINARY ROUNDS
Behavioral Health Interdisciplinary Rounds Patient Name: Perla Mcnair  Age: 46 y.o. Room/Bed:  727/02 Primary Diagnosis: <principal problem not specified> Admission Status: Voluntary Readmission within 30 days: no 
Power of  in place: no 
Patient requires a blocked bed: no          Reason for blocked bed: VTE Prophylaxis: No 
 
Mobility needs/Fall risk: no 
Flu Vaccine : no  
Nutritional Plan: no 
Consults:         
Labs/Testing due today?: yes Sleep hours:  8 Participation in Care/Groups:  yes Medication Compliant?: Yes PRNS (last 24 hours): Sleep Aid Restraints (last 24 hours):  no 
  
CIWA (range last 24 hours): COWS (range last 24 hours): Alcohol screening (AUDIT) completed - If applicable, date SBIRT discussed in treatment team AND documented:  
AUDIT Screen Score: Document Brief Intervention (corresponds directly with the 5 A's, Ask, Advise, Assess, Assist, and Arrange): At- Risk Patients (Score 7-15 for women; 8-15 for men) Discuss concern patient is drinking at unhealthy levels known to increase risk of alcohol-related health problems. Is Patient ready to commit to change? If No: 
? Encourage reflection ? Discuss short term and long term health risks of consuming alcohol ? Barriers to change ? Reaffirm willingness to help / Educational materials provided If Yes: 
? Set goal 
? Plan 
? Educational materials provided Harmful use or Dependence (Score 16 or greater) ? Discuss short term and long term health risks of consuming alcohol ? Recommendations ? Negotiate drinking goal 
? Recommend addiction specialist/center ? Arrange follow-up appointments. Tobacco - patient is a smoker: Have You Used Tobacco in the Past 30 Days: No 
Illegal Drugs use: Have You Used Any Illegal Substances Over the Past 12 Months: Yes 
 
24 hour chart check complete: yes Patient goal(s) for today: Meet treatment team and acclimate to unit. Treatment team focus/goals: Assess needs for treatment and safe discharge. Progress note: Pt is alert, oriented, calm, cooperative. Depressed, hopeless, following suicide attempt with gun. LOS:  1  Expected LOS: 5-7 Financial concerns/prescription coverage:  [address] Family contact: girlfriend, April ([phone]) Family requesting physician contact today: no 
Discharge plan: return home Access to weapons : Jeanette Borrego Outpatient provider(s): to be linked Patient's preferred phone number for follow up call : [phone] Participating treatment team members: Cornelio De La Cruz, MSW; Donna Hancock, PharmD; Matthias Alcaraz, PRISCILA; Dhruv Mcdonald, RN

## 2019-11-12 NOTE — PROGRESS NOTES
These goals will be met by 11/15/19    [facility]  Master Treatment Plan for Adilene Walker    Date Treatment Plan Initiated: 11/11/19    Treatment Plan Modalities:  Type of Modality Amount  (x minutes) Frequency (x/week) Duration (x days) Name of Responsible Staff   [address] meetings to encourage peer interactions 15 7 1 Elvin WEATHERS     Group psychotherapy to assist in building coping skills and internal controls 60 7 1 Juan Farley   Therapeutic activity groups to build coping skills 60 7 1 Juan Farley   Psychoeducation in group setting to address:   Medication education   15 7 225 [facility] skills         Relaxation techniques         Symptom management         Discharge planning   61 2 255 [facility]    60 2 1 150 [facility]   60 1 1 volunteer   Recovery/AA/NA      volunteer   Physician medication management   15 7 [address] [facility]   Family meeting/discharge planning   15 2 1194 [facility]                                   Problem: Depressed Mood (Adult/Pediatric)  Goal: *STG: Participates in treatment plan  Outcome: Progressing Towards Goal  Note:   Out on unit social w peers and staff. Mood and affect smiling and social/engaged with peers and during activities. Denies SI. Describes social stressors and feeling depressed and increase anxiety. Daily goal is to discuss medications.  Staff focus is on medication and coping skills education  Goal: *STG: Verbalizes anger, guilt, and other feelings in a constructive manor  Outcome: Progressing Towards Goal  Goal: *STG: Attends activities and groups  Outcome: Progressing Towards Goal  Goal: *STG: Demonstrates reduction in symptoms and increase in insight into coping skills/future focused  Outcome: Progressing Towards Goal  Goal: Interventions  Outcome: Progressing Towards Goal

## 2019-11-12 NOTE — PROGRESS NOTES
Problem: Depressed Mood (Adult/Pediatric)  Goal: *STG: Participates in treatment plan  Outcome: Progressing Towards Goal  Note:   9880: Greeted patient on unit in room resting. Appears in no acute distress. No voiced concerns at present. Will continue to monitor on Q 15 minute safety checks. 1900: Patient alert, vitals stable, wnl.   Medication and meal compliant. Attended group.    Prn Atarax 50 mg given for anxiety at patients request.

## 2019-11-12 NOTE — PROGRESS NOTES
Problem: Depressed Mood (Adult/Pediatric)  Goal: *STG: Attends activities and groups  Outcome: Progressing Towards Goal  Goal: *STG: Remains safe in hospital  Outcome: Progressing Towards Goal  Goal: *STG: Complies with medication therapy  Outcome: Progressing Towards Goal

## 2019-11-12 NOTE — PROGRESS NOTES
Problem: Falls - Risk of  Goal: *Absence of Falls  Description  Document Gordon Slade Fall Risk and appropriate interventions in the flowsheet. Outcome: Progressing Towards Goal  Note:   Fall Risk Interventions:      Resting in bed with eyes closed, no complaints, no complaints, no distress noted. Safety measures in place, will continue to monitor.
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
## 2019-11-13 PROCEDURE — 74011250637 HC RX REV CODE- 250/637: Performed by: NURSE PRACTITIONER

## 2019-11-13 PROCEDURE — 74011000250 HC RX REV CODE- 250: Performed by: NURSE PRACTITIONER

## 2019-11-13 PROCEDURE — 74011250637 HC RX REV CODE- 250/637: Performed by: PSYCHIATRY & NEUROLOGY

## 2019-11-13 PROCEDURE — 65220000003 HC RM SEMIPRIVATE PSYCH

## 2019-11-13 RX ORDER — LIDOCAINE 50 MG/G
1 PATCH TOPICAL EVERY 24 HOURS
Status: DISCONTINUED | OUTPATIENT
Start: 2019-11-13 | End: 2019-11-15 | Stop reason: HOSPADM

## 2019-11-13 RX ADMIN — DULOXETINE HYDROCHLORIDE 30 MG: 30 CAPSULE, DELAYED RELEASE ORAL at 08:11

## 2019-11-13 RX ADMIN — ACETAMINOPHEN 650 MG: 325 TABLET ORAL at 12:34

## 2019-11-13 RX ADMIN — ATENOLOL 25 MG: 25 TABLET ORAL at 16:17

## 2019-11-13 RX ADMIN — ACETAMINOPHEN 650 MG: 325 TABLET ORAL at 16:18

## 2019-11-13 RX ADMIN — PANTOPRAZOLE SODIUM 40 MG: 40 TABLET, DELAYED RELEASE ORAL at 10:15

## 2019-11-13 RX ADMIN — TRAZODONE HYDROCHLORIDE 50 MG: 50 TABLET ORAL at 22:11

## 2019-11-13 RX ADMIN — LISINOPRIL 20 MG: 20 TABLET ORAL at 08:11

## 2019-11-13 RX ADMIN — ALLOPURINOL 300 MG: 100 TABLET ORAL at 08:11

## 2019-11-13 NOTE — PROGRESS NOTES
Problem: Depressed Mood (Adult/Pediatric)  Goal: *STG: Participates in treatment plan  Outcome: Progressing Towards Goal  Note:   Out on unit social w peers and staff. Mood and affect manipulative, passive aggressive, dismissive. Primary focus is on how the unit is run and other peers/staff behaviors and unit rules. Poor insight into his lack of healthy coping skills and chronic use of unhealthy skills. Requested to be discharged.  [address] to eval for a TDO  Goal: *STG: Verbalizes anger, guilt, and other feelings in a constructive manor  Outcome: Progressing Towards Goal  Goal: *STG: Attends activities and groups  Outcome: Progressing Towards Goal  Goal: *STG: Demonstrates reduction in symptoms and increase in insight into coping skills/future focused  Outcome: Progressing Towards Goal  Goal: Interventions  Outcome: Progressing Towards Goal
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
## 2019-11-13 NOTE — BH NOTES
GROUP THERAPY PROGRESS NOTE    Jayjay Damon is participating in Coping Skills Group. Group time: 1 hour    Personal goal for participation: To address all types of coping skills-good and bad, encouraging each client to present those skills they utilize and then work toward reducing those that are unhealthy and trying to use at least one to two new healthier ways to work thru problems. Goal orientation: personal    Group therapy participation: active    Therapeutic interventions reviewed and discussed: cognitive, behavioral and insight oriented. Impression of participation: Pt was participatory in the group. He was appropriate as he listened to others and offered some suggestions/ideas for others as well as seemed comfortable with his peers as they made suggestions for him re: his coping skills. Pt was alert and oriented X3.

## 2019-11-13 NOTE — INTERDISCIPLINARY ROUNDS
b 
 
[address] [facility] Patient Name: Annabelle Song  Age: 46 y.o. Room/Bed:  727/ Primary Diagnosis: <principal problem not specified> Admission Status: Voluntary Readmission within 30 days: no 
Power of  in place: no 
Patient requires a blocked bed: no          Reason for blocked bed: VTE Prophylaxis: No 
 
Mobility needs/Fall risk: no 
Flu Vaccine : no  
Nutritional Plan: no 
Consults:         
Labs/Testing due today?: no 
 
Sleep hours:  6.75 Participation in Care/Groups:  yes Medication Compliant?: Yes PRNS (last 24 hours): Sleep Aid Restraints (last 24 hours):  no 
  
CIWA (range last 24 hours): COWS (range last 24 hours): Alcohol screening (AUDIT) completed - If applicable, date SBIRT discussed in treatment team AND documented:  
AUDIT Screen Score: Document Brief Intervention (corresponds directly with the 5 A's, Ask, Advise, Assess, Assist, and Arrange): At- Risk Patients (Score 7-15 for women; 8-15 for men) Discuss concern patient is drinking at unhealthy levels known to increase risk of alcohol-related health problems. Is Patient ready to commit to change? If No: 
? Encourage reflection ? Discuss short term and long term health risks of consuming alcohol ? Barriers to change ? Reaffirm willingness to help / Educational materials provided If Yes: 
? Set goal 
? Plan 
? Educational materials provided Harmful use or Dependence (Score 16 or greater) ? Discuss short term and long term health risks of consuming alcohol ? Recommendations ? Negotiate drinking goal 
? Recommend addiction specialist/center ? Arrange follow-up appointments. Tobacco - patient is a smoker: Have You Used Tobacco in the Past 30 Days: No 
Illegal Drugs use: Have You Used Any Illegal Substances Over the Past 12 Months: Yes 
 
24 hour chart check complete: yes Patient goal(s) for today: Pt is asking to talk with [address] Treatment team focus/goals: Call [address] to assess for TDO status. Progress note: Pt is alert, oriented, angry and demanding to be discharged. Pt yelled at staff and was not easily redirected. LOS:  2  Expected LOS: 5-7 
  
Financial concerns/prescription coverage:  [address] Family contact: girlfriend, April ([phone]) Family requesting physician contact today: no 
Discharge plan: return home Access to weapons :   [employer]  - agreed to let wife remove from home.                             
Outpatient provider(s): to be linked ; follow up with [facility];  
Patient's preferred phone number for follow up call : [phone] 
  
Participating treatment team members: Omar Pringle, Emely Ha, HANK; Adrienne Nguyen, PharmD; Pablito Perez, RN; Luna Neff, RN

## 2019-11-13 NOTE — GROUP NOTE
[facility] GROUP DOCUMENTATION INDIVIDUAL Group Therapy Note Date: November 12 Group Start Time: 2030 Group End Time: 2110 Group Topic: Reflection/Relaxation [address] Jaimee ZAMARRIPA 
 
[facility] GROUP DOCUMENTATION GROUP Group Therapy Note Attendees:  
 
  
 
Attendance: Attended Patient's Goal:  Unit orientation and daily progress Interventions/techniques: Supported Follows Directions: Followed directions Interactions: Other Hyper-verbal in group with abundant use of profanity. Required gentle redirection. Mental Status: Elevated Behavior/appearance: Attentive, Cooperative and Enthusiastic Goals Achieved: Able to engage in interactions and Able to listen to others Additional Notes:  Hyper-verbal in group with focus of comments on test results showing incorrectly what he had in his system. Angry with MD and staff. Encouraged to clarify results tomorrow in treatment team.  
 
Niki Wood

## 2019-11-13 NOTE — BH NOTES
RN made Iron Carlton with [address] [place] [address] aware of TDO eval    Iron Carlton spoke with patient at 200 and agreed to stay. Iron Carlton informed RN to call if he decides he wants to leave again.

## 2019-11-13 NOTE — PROGRESS NOTES
PSYCHIATRIC PROGRESS NOTE       Patient: Radha Rutledge MRN: [id number]  SSN: [id number]    YOB: 1965  Age: 46 y.o. Sex: male      Admit Date: 11/11/2019    LOS: 2 days       Chief Complaint:  I'm alright. Interval History:  Radha Rutledge states he is feeling a lot better and is adamant to  be discharged. States he has \"gotten out of that situation. \" Denies si hi or avh. Very irritable and argumentative during treatment team rounds. He has guns at home. His insight is very poor, very discharge focused. I am certainly very concern about flight into health. He admitted that he has always struggled with depression and was contemplating suicide all his life, he ended up putting a gun into his head. Again, he has no insight. We will request [facility] for tdo evaluation.        Past Medical History:  Past Medical History:   Diagnosis Date    Anxiety     Arthritis     back /gout    Asthma     Pt denies any sx currently 12/2/15    Chronic pain     Rt knee; has had cortisone shots    Diverticulitis     diverticulosis, pancreatitis    GERD (gastroesophageal reflux disease)     Gout     Right and left toes:  on preventative Rx so denies any problems    History of kidney stones     x1    Hypertension     MRSA (methicillin resistant staph aureus) culture positive 9/8/15    nares, treated with ABX    Psychiatric disorder     anxiety         ALLERGIES:(reviewed/updated 11/13/2019)  No Known Allergies    Laboratory report:  Lab Results   Component Value Date/Time    WBC 19.0 (H) 11/11/2019 12:15 PM    Hemoglobin (POC) 16.0 01/24/2014 10:23 AM    HGB 15.6 11/11/2019 12:15 PM    Hematocrit (POC) 47 01/24/2014 10:23 AM    HCT 49.3 11/11/2019 12:15 PM    PLATELET 436 (H) 89/13/4066 12:15 PM    MCV 97.8 11/11/2019 12:15 PM      Lab Results   Component Value Date/Time    Sodium 136 11/11/2019 12:15 PM    Potassium 4.0 11/11/2019 12:15 PM    Chloride 101 11/11/2019 12:15 PM    CO2 30 11/11/2019 12:15 PM Anion gap 5 11/11/2019 12:15 PM    Glucose 97 11/12/2019 05:17 AM    BUN 37 (H) 11/11/2019 12:15 PM    Creatinine 1.22 11/11/2019 12:15 PM    BUN/Creatinine ratio 30 (H) 11/11/2019 12:15 PM    GFR est AA >60 11/11/2019 12:15 PM    GFR est non-AA >60 11/11/2019 12:15 PM    Calcium 9.9 11/11/2019 12:15 PM    Bilirubin, total 0.5 11/11/2019 12:15 PM    AST (SGOT) 15 11/11/2019 12:15 PM    Alk. phosphatase 95 11/11/2019 12:15 PM    Protein, total 8.7 (H) 11/11/2019 12:15 PM    Albumin 4.2 11/11/2019 12:15 PM    Globulin 4.5 (H) 11/11/2019 12:15 PM    A-G Ratio 0.9 (L) 11/11/2019 12:15 PM    ALT (SGPT) 26 11/11/2019 12:15 PM      Vitals:    11/12/19 1149 11/12/19 1536 11/12/19 1932 11/13/19 0720   BP: 132/80 (!) 151/94 121/63 134/78   Pulse: 60 (!) 56 62 61   Resp: 18 18 18 18   Temp: 98.1 °F (36.7 °C) 98 °F (36.7 °C) 98 °F (36.7 °C) 97.5 °F (36.4 °C)   SpO2: 99% 97% 96% 97%   Weight:       Height:           No results found for: VALF2, VALAC, VALP, VALPR, DS6, CRBAM, CRBAMP, CARB2, XCRBAM  No results found for: LITHM    Vital Signs  Patient Vitals for the past 24 hrs:   Temp Pulse Resp BP SpO2   11/13/19 0720 97.5 °F (36.4 °C) 61 18 134/78 97 %   11/12/19 1932 98 °F (36.7 °C) 62 18 121/63 96 %   11/12/19 1536 98 °F (36.7 °C) (!) 56 18 (!) 151/94 97 %   11/12/19 1149 98.1 °F (36.7 °C) 60 18 132/80 99 %     Wt Readings from Last 3 Encounters:   11/11/19 120.7 kg (266 lb 3.2 oz)   09/03/19 127 kg (279 lb 15.8 oz)   01/26/19 121.5 kg (267 lb 13.7 oz)     Temp Readings from Last 3 Encounters:   11/13/19 97.5 °F (36.4 °C)   09/03/19 98.7 °F (37.1 °C)   01/26/19 97.4 °F (36.3 °C)     BP Readings from Last 3 Encounters:   11/13/19 134/78   09/03/19 (!) 156/94   01/26/19 134/78     Pulse Readings from Last 3 Encounters:   11/13/19 61   09/03/19 80   01/26/19 63       Radiology (reviewed/updated 11/13/2019)  Ct Abd Pelv W Cont    Result Date: 9/3/2019  INDICATION: Abdominal pain, right lower quadrant pain.  Nausea and epigastric pain since August 30. CT of the abdomen and pelvis is performed with 5 mm collimation. Study is performed with 100 cc of nonionic Isovue 370. Sagittal and coronal reformatted images were also performed. CT dose reduction was achieved with the use of the standardized protocol tailored for this examination and automatic exposure control for dose modulation. Direct comparison is made to prior CT dated January 2019. Findings: Lung bases: The visualized lung bases are clear. Liver: There is diffuse fatty infiltration of the liver. Adrenals: Adrenal glands are normal. Pancreas: The pancreas is normal. Gallbladder: The gallbladder is normal. Kidneys: The kidneys are normal. Spleen: The spleen is normal. Lymph nodes. There is no moe hepatitis, mesenteric, retroperitoneal or pelvic lymphadenopathy. Bowel: No thickened or dilated loop of large or small bowel is visualized. Scattered colonic diverticulosis is noted. Appendix: The appendix is normal. Urinary bladder: Urinary bladder is partially filled and grossly normal. Miscellaneous: There is no free intraperitoneal fluid or gas. There is no focal fluid collection to suggest abscess. IMPRESSION: Normal appendix. Side Effects: (reviewed/updated 11/13/2019)  None reported or admitted to. Review of Systems: (reviewed/updated 11/13/2019)  Appetite: good  Sleep: good   All other Review of Systems: negative    Mental Status Exam:  Eye contact: Good eye contact  Psychomotor activity:  Agitated  Speech is spontaneous  Thought process: Logical and goal directed   Mood is \"ok\"  Affect: Constricted  Perception: No avh  Suicidal ideation: No si  Homicidal ideation: No hi  Insight/judgment: Poor  Cognition is grossly intact      Physical Exam:  Musculoskeletal system: steady gait  Tremor not present  Cog wheeling not present      Assessment and Plan:  Kenia Pratt meets criteria for diagnoses of   1.   Major depressive disorder, recurrent, severe without psychotic features. 2.  Cannabis use disorder, severe. 3.  Opiate use disorder, unspecified. Continue current medications as prescribed. [facility] to assess for tdo. We will closely monitor for safety. We will encourage reality orientation. Disposition planning to continue. I certify that this patients inpatient psychiatric hospital services furnished since the previous certification were, and continue to be, required for treatment that could reasonably be expected to improve the patient's condition, or for diagnostic study, and that the patient continues to need, on a daily basis, active treatment furnished directly by or requiring the supervision of inpatient psychiatric facility personnel. In addition, the hospital records show that services furnished were intensive treatment services, admission or related services, or equivalent services.       Signed:  Kelly Pepe NP  11/13/2019

## 2019-11-14 PROCEDURE — 74011250637 HC RX REV CODE- 250/637: Performed by: PSYCHIATRY & NEUROLOGY

## 2019-11-14 PROCEDURE — 65220000003 HC RM SEMIPRIVATE PSYCH

## 2019-11-14 PROCEDURE — 74011000250 HC RX REV CODE- 250: Performed by: NURSE PRACTITIONER

## 2019-11-14 PROCEDURE — 74011250637 HC RX REV CODE- 250/637: Performed by: NURSE PRACTITIONER

## 2019-11-14 RX ORDER — TRAZODONE HYDROCHLORIDE 100 MG/1
100 TABLET ORAL
Status: DISCONTINUED | OUTPATIENT
Start: 2019-11-14 | End: 2019-11-15 | Stop reason: HOSPADM

## 2019-11-14 RX ORDER — LISINOPRIL 10 MG/1
10 TABLET ORAL ONCE
Status: ACTIVE | OUTPATIENT
Start: 2019-11-14 | End: 2019-11-15

## 2019-11-14 RX ORDER — DULOXETIN HYDROCHLORIDE 60 MG/1
60 CAPSULE, DELAYED RELEASE ORAL DAILY
Status: DISCONTINUED | OUTPATIENT
Start: 2019-11-15 | End: 2019-11-15 | Stop reason: HOSPADM

## 2019-11-14 RX ADMIN — ATENOLOL 25 MG: 25 TABLET ORAL at 17:36

## 2019-11-14 RX ADMIN — ALLOPURINOL 300 MG: 100 TABLET ORAL at 08:14

## 2019-11-14 RX ADMIN — LISINOPRIL 20 MG: 20 TABLET ORAL at 08:14

## 2019-11-14 RX ADMIN — DULOXETINE HYDROCHLORIDE 30 MG: 30 CAPSULE, DELAYED RELEASE ORAL at 08:15

## 2019-11-14 RX ADMIN — TRAZODONE HYDROCHLORIDE 100 MG: 100 TABLET ORAL at 21:27

## 2019-11-14 RX ADMIN — PANTOPRAZOLE SODIUM 40 MG: 40 TABLET, DELAYED RELEASE ORAL at 06:46

## 2019-11-14 RX ADMIN — HYDROXYZINE HYDROCHLORIDE 50 MG: 50 TABLET, FILM COATED ORAL at 08:15

## 2019-11-14 NOTE — INTERDISCIPLINARY ROUNDS
Behavioral Health Interdisciplinary Rounds Patient Name: Radha Rutledge  Age: 46 y.o. Room/Bed:  727/02 Primary Diagnosis: <principal problem not specified> Admission Status: Voluntary Readmission within 30 days: no 
Power of  in place: no 
Patient requires a blocked bed: no          Reason for blocked bed: VTE Prophylaxis: No 
 
Mobility needs/Fall risk: no 
Flu Vaccine : no  
Nutritional Plan: no 
Consults:       
Labs/Testing due today?: no 
 
Sleep hours: 6+ Participation in Care/Groups:  yes Medication Compliant?: Yes PRNS (last 24 hours): Sleep Aid and Pain Restraints (last 24 hours):  no 
  
CIWA (range last 24 hours): COWS (range last 24 hours): Alcohol screening (AUDIT) completed - If applicable, date SBIRT discussed in treatment team AND documented:  
AUDIT Screen Score: Document Brief Intervention (corresponds directly with the 5 A's, Ask, Advise, Assess, Assist, and Arrange): At- Risk Patients (Score 7-15 for women; 8-15 for men) Discuss concern patient is drinking at unhealthy levels known to increase risk of alcohol-related health problems. Is Patient ready to commit to change? If No: 
? Encourage reflection ? Discuss short term and long term health risks of consuming alcohol ? Barriers to change ? Reaffirm willingness to help / Educational materials provided If Yes: 
? Set goal 
? Plan 
? Educational materials provided Harmful use or Dependence (Score 16 or greater) ? Discuss short term and long term health risks of consuming alcohol ? Recommendations ? Negotiate drinking goal 
? Recommend addiction specialist/center ? Arrange follow-up appointments. Tobacco - patient is a smoker: Have You Used Tobacco in the Past 30 Days: No 
Illegal Drugs use: Have You Used Any Illegal Substances Over the Past 12 Months: Yes 
 
24 hour chart check complete: yes Patient goal(s) for today:  Attend groups, take medications as prescribed, prepare for discharge tomorrow. Treatment team focus/goals: Titrate medications, increase cymbalta to 60mg,  and coordinate follow up for post discharge. Progress note:  Pt is alert, oriented, calm, cooperative. Pt apologized for angry outburst yesterday. Pt denied adverse effects of medications. Pt rated depresses 3/10 and and anxiety much higher. LOS:  3  Expected LOS:  4 
  
Financial concerns/prescription coverage:  [employer]  
Family contact: girlfriend, April ([phone]) Family requesting physician contact today: no 
Discharge plan: return home Access to weapons :   wife removed all firearms and drug paraphernalia from home.                           
Outpatient provider(s): to be linked ; follow up with [facility];  
Patient's preferred phone number for follow up call : [phone] 
  
Participating treatment team members: HANK Vizcaino; Griffin Lind, PharmD; Lee Stewart, RN; Tucker Flynn RN

## 2019-11-14 NOTE — PROGRESS NOTES
Problem: Depressed Mood (Adult/Pediatric)  Goal: *STG: Participates in treatment plan  Outcome: Progressing Towards Goal  Note:   Out on unit engaged and brighter. Insight into his argumentative and manipulative behaviors yesterday in tx team and apologized describing in meeting he becomes anxious.  Denies SI. Staff focus is on d/c planning  Goal: *STG: Verbalizes anger, guilt, and other feelings in a constructive manor  Outcome: Progressing Towards Goal  Goal: *STG: Attends activities and groups  Outcome: Progressing Towards Goal  Goal: *STG: Demonstrates reduction in symptoms and increase in insight into coping skills/future focused  Outcome: Progressing Towards Goal  Goal: Interventions  Outcome: Progressing Towards Goal

## 2019-11-14 NOTE — PROGRESS NOTES
Problem: Falls - Risk of  Goal: *Absence of Falls  Description  Document [facility] Fall Risk and appropriate interventions in the flowsheet.   Outcome: Progressing Towards Goal  Note:   Fall Risk Interventions:       Mentation Interventions: Adequate sleep, hydration, pain control    Medication Interventions: Teach patient to arise slowly     Lying quietly in bed with eyes closed, respirations even and unlabored ,  Q15 min safety monitoring continues

## 2019-11-14 NOTE — PROGRESS NOTES
PSYCHIATRIC PROGRESS NOTE       Patient: Martin Velazquez MRN: [id number]  SSN: [id number]    YOB: 1965  Age: 46 y.o. Sex: male      Admit Date: 11/11/2019    LOS: 3 days       Chief Complaint:  Much better. Interval History:  Martin Velazquez is much calmer today, states he feels better. He admits that he was really frustrated prior to coming in and had a bad day yesterday. He is attending groups and feels he is benefiting from them. His depression is better but has a lot of anxiety. Sleeping poorly. He is tolerating his meds and denies side effects. Denies si hi or avh. He is future oriented and more positive.         Past Medical History:  Past Medical History:   Diagnosis Date    Anxiety     Arthritis     back /gout    Asthma     Pt denies any sx currently 12/2/15    Chronic pain     Rt knee; has had cortisone shots    Diverticulitis     diverticulosis, pancreatitis    GERD (gastroesophageal reflux disease)     Gout     Right and left toes:  on preventative Rx so denies any problems    History of kidney stones     x1    Hypertension     MRSA (methicillin resistant staph aureus) culture positive 9/8/15    nares, treated with ABX    Psychiatric disorder     anxiety         ALLERGIES:(reviewed/updated 11/14/2019)  No Known Allergies    Laboratory report:  Lab Results   Component Value Date/Time    WBC 19.0 (H) 11/11/2019 12:15 PM    Hemoglobin (POC) 16.0 01/24/2014 10:23 AM    HGB 15.6 11/11/2019 12:15 PM    Hematocrit (POC) 47 01/24/2014 10:23 AM    HCT 49.3 11/11/2019 12:15 PM    PLATELET 988 (H) 28/96/3943 12:15 PM    MCV 97.8 11/11/2019 12:15 PM      Lab Results   Component Value Date/Time    Sodium 136 11/11/2019 12:15 PM    Potassium 4.0 11/11/2019 12:15 PM    Chloride 101 11/11/2019 12:15 PM    CO2 30 11/11/2019 12:15 PM    Anion gap 5 11/11/2019 12:15 PM    Glucose 97 11/12/2019 05:17 AM    BUN 37 (H) 11/11/2019 12:15 PM    Creatinine 1.22 11/11/2019 12:15 PM    BUN/Creatinine ratio 30 (H) 11/11/2019 12:15 PM    GFR est AA >60 11/11/2019 12:15 PM    GFR est non-AA >60 11/11/2019 12:15 PM    Calcium 9.9 11/11/2019 12:15 PM    Bilirubin, total 0.5 11/11/2019 12:15 PM    AST (SGOT) 15 11/11/2019 12:15 PM    Alk. phosphatase 95 11/11/2019 12:15 PM    Protein, total 8.7 (H) 11/11/2019 12:15 PM    Albumin 4.2 11/11/2019 12:15 PM    Globulin 4.5 (H) 11/11/2019 12:15 PM    A-G Ratio 0.9 (L) 11/11/2019 12:15 PM    ALT (SGPT) 26 11/11/2019 12:15 PM      Vitals:    11/13/19 0720 11/13/19 1617 11/13/19 1940 11/14/19 0749   BP: 134/78 147/79 (!) 147/95 170/88   Pulse: 61 71 (!) 57 63   Resp: 18 16 16 18   Temp: 97.5 °F (36.4 °C) 97.4 °F (36.3 °C) 97.8 °F (36.6 °C) 98.1 °F (36.7 °C)   SpO2: 97% 95%  97%   Weight:       Height:           No results found for: VALF2, VALAC, VALP, VALPR, DS6, CRBAM, CRBAMP, CARB2, XCRBAM  No results found for: LITHM    Vital Signs  Patient Vitals for the past 24 hrs:   Temp Pulse Resp BP SpO2   11/14/19 0749 98.1 °F (36.7 °C) 63 18 170/88 97 %   11/13/19 1940 97.8 °F (36.6 °C) (!) 57 16 (!) 147/95    11/13/19 1617 97.4 °F (36.3 °C) 71 16 147/79 95 %     Wt Readings from Last 3 Encounters:   11/11/19 120.7 kg (266 lb 3.2 oz)   09/03/19 127 kg (279 lb 15.8 oz)   01/26/19 121.5 kg (267 lb 13.7 oz)     Temp Readings from Last 3 Encounters:   11/14/19 98.1 °F (36.7 °C)   09/03/19 98.7 °F (37.1 °C)   01/26/19 97.4 °F (36.3 °C)     BP Readings from Last 3 Encounters:   11/14/19 170/88   09/03/19 (!) 156/94   01/26/19 134/78     Pulse Readings from Last 3 Encounters:   11/14/19 63   09/03/19 80   01/26/19 63       Radiology (reviewed/updated 11/14/2019)  Ct Abd Pelv W Cont    Result Date: 9/3/2019  INDICATION: Abdominal pain, right lower quadrant pain. Nausea and epigastric pain since August 30. CT of the abdomen and pelvis is performed with 5 mm collimation. Study is performed with 100 cc of nonionic Isovue 370. Sagittal and coronal reformatted images were also performed.  CT dose reduction was achieved with the use of the standardized protocol tailored for this examination and automatic exposure control for dose modulation. Direct comparison is made to prior CT dated January 2019. Findings: Lung bases: The visualized lung bases are clear. Liver: There is diffuse fatty infiltration of the liver. Adrenals: Adrenal glands are normal. Pancreas: The pancreas is normal. Gallbladder: The gallbladder is normal. Kidneys: The kidneys are normal. Spleen: The spleen is normal. Lymph nodes. There is no moe hepatitis, mesenteric, retroperitoneal or pelvic lymphadenopathy. Bowel: No thickened or dilated loop of large or small bowel is visualized. Scattered colonic diverticulosis is noted. Appendix: The appendix is normal. Urinary bladder: Urinary bladder is partially filled and grossly normal. Miscellaneous: There is no free intraperitoneal fluid or gas. There is no focal fluid collection to suggest abscess. IMPRESSION: Normal appendix. Side Effects: (reviewed/updated 11/14/2019)  None reported or admitted to. Review of Systems: (reviewed/updated 11/14/2019)  Appetite: good  Sleep: good   All other Review of Systems: negative    Mental Status Exam:  Eye contact: Good eye contact  Psychomotor activity:  relaxed  Speech is spontaneous  Thought process: Logical and goal directed   Mood is \"ok\"  Affect: full  Perception: No avh  Suicidal ideation: No si  Homicidal ideation: No hi  Insight/judgment: Poor  Cognition is grossly intact      Physical Exam:  Musculoskeletal system: steady gait  Tremor not present  Cog wheeling not present      Assessment and Plan:  Lindsey Peterson meets criteria for diagnoses of   1. Major depressive disorder, recurrent, severe without psychotic features. 2.  Cannabis use disorder, severe. 3.  Opiate use disorder, unspecified. Increase cymbalta to 60mg daily. Increase trazodone to 100mg. Continue rest of medications as prescribed.   We will closely monitor for safety. We will encourage reality orientation. Plan for dc in am if stable in the next 24 hours. I certify that this patients inpatient psychiatric hospital services furnished since the previous certification were, and continue to be, required for treatment that could reasonably be expected to improve the patient's condition, or for diagnostic study, and that the patient continues to need, on a daily basis, active treatment furnished directly by or requiring the supervision of inpatient psychiatric facility personnel. In addition, the hospital records show that services furnished were intensive treatment services, admission or related services, or equivalent services.       Signed:  Lizy Xavier NP  11/14/2019

## 2019-11-14 NOTE — PROGRESS NOTES
Problem: Discharge Planning  Goal: *Discharge to safe environment  Outcome: Progressing Towards Goal  Note:   Patient identifies home as a safe environment and plans to return home upon discharge. Goal: *Knowledge of medication management  Outcome: Progressing Towards Goal  Note:   Patient is taking medications as prescribed. Goal: *Knowledge of discharge instructions  Outcome: Progressing Towards Goal  Note:   Patient verbalized understanding for treatment and safe discharge.

## 2019-11-14 NOTE — BH NOTES
GROUP THERAPY PROGRESS NOTE    Dollene Severe is participating in [employer].      Group time: 20 minutes    Personal goal for participation: think positive    Goal orientation: community    Group therapy participation: active    Therapeutic interventions reviewed and discussed: yes    Impression of participation: engaged

## 2019-11-14 NOTE — BH NOTES
GROUP THERAPY PROGRESS NOTE    Judie Nevarez is participating in  D/C Planning Group    Group time: 2.5 hours    Personal goal for participation:  Tool (s) for Recovery     Goal orientation: Introduction: Wellness Recovery Action Plan    Group therapy participation: active    Therapeutic interventions reviewed and discussed:     Impression of participation: Pt was engaged, willing to self disclosed issues surrounding his recovery and seeing the need for [facility]

## 2019-11-14 NOTE — PROGRESS NOTES
Problem: Falls - Risk of  Goal: *Absence of Falls  Description  Document [place] Beady Fall Risk and appropriate interventions in the flowsheet. Outcome: Progressing Towards Goal  Note:   Fall Risk Interventions:       Mentation Interventions: Adequate sleep, hydration, pain control    Medication Interventions: Teach patient to arise slowly                   Problem: Falls - Risk of  Goal: *Absence of Falls  Description  Document [place] Beady Fall Risk and appropriate interventions in the flowsheet. Outcome: Progressing Towards Goal  Note:   Fall Risk Interventions:       Mentation Interventions: Adequate sleep, hydration, pain control    Medication Interventions: Teach patient to arise slowly    Out in milieu laughing and smiling with peers. Mood and affect is brighter following visit with wife. Meal and medication compliant. Staff will continue to monitor q 15 min checks.

## 2019-11-14 NOTE — GROUP NOTE
[facility] GROUP DOCUMENTATION INDIVIDUAL Group Therapy Note Date: November 13 Group Start Time: 2045 Group End Time: 2115 Group Topic: Reflection/Relaxation [address] Adi P[place] GROUP DOCUMENTATION GROUP Group Therapy Note Attendees: 9/10 Attendance: Attended Patient's Goal:  Reflect on self-care routine Interventions/techniques: Informed and Supported Follows Directions: Followed directions Interactions: Interacted appropriately Mental Status: Calm Behavior/appearance: Attentive Goals Achieved: Able to engage in interactions and Able to listen to others Additional Notes: Staff answered any questions that patients had regarding the unit and regarding discharge planning. Staff provided a self-care assessment for group to fill out individually to think about the things they do/don't do for self-care at home normally. Talked about having issues with meditation because he can't clear his mind but wants to keep working on it. Shared that he likes walking his dog and fishing and staff suggested attempting to meditate then. Zaira Gar

## 2019-11-14 NOTE — GROUP NOTE
[facility]  GROUP DOCUMENTATION INDIVIDUAL Group Therapy Note Date: November 14 Group Start Time: 2035 Group End Time: 2110 Group Topic: Reflection/Relaxation [address] [place] [address] Roman [place] [facility] GROUP DOCUMENTATION GROUP Group Therapy Note Attendees: 11/11 Attendance: Attended Patient's Goal:  Reflect on sleep habits Interventions/techniques: Informed and Supported Follows Directions: Followed directions Interactions: Interacted appropriately Mental Status: Calm Behavior/appearance: Attentive and Cooperative Goals Achieved: Able to engage in interactions and Able to listen to others Additional Notes: Staff addressed any questions regarding the unit and regarding discharge planning with group. Staff provided a worksheet covering the \"do's and don'ts\" of a healthy night sleep and talked about the benefits of getting a good night of sleep. Tesfaye Doyle

## 2019-11-15 VITALS
RESPIRATION RATE: 16 BRPM | DIASTOLIC BLOOD PRESSURE: 71 MMHG | BODY MASS INDEX: 35.28 KG/M2 | WEIGHT: 266.2 LBS | SYSTOLIC BLOOD PRESSURE: 117 MMHG | HEIGHT: 73 IN | TEMPERATURE: 97.4 F | HEART RATE: 91 BPM | OXYGEN SATURATION: 97 %

## 2019-11-15 PROCEDURE — 74011250637 HC RX REV CODE- 250/637: Performed by: NURSE PRACTITIONER

## 2019-11-15 RX ORDER — LISINOPRIL 30 MG/1
30 TABLET ORAL DAILY
Qty: 30 TAB | Refills: 0 | Status: SHIPPED | OUTPATIENT
Start: 2019-11-16

## 2019-11-15 RX ORDER — DULOXETIN HYDROCHLORIDE 60 MG/1
60 CAPSULE, DELAYED RELEASE ORAL DAILY
Qty: 30 CAP | Refills: 0 | Status: SHIPPED | OUTPATIENT
Start: 2019-11-16

## 2019-11-15 RX ADMIN — PANTOPRAZOLE SODIUM 40 MG: 40 TABLET, DELAYED RELEASE ORAL at 08:13

## 2019-11-15 RX ADMIN — DULOXETINE HYDROCHLORIDE 60 MG: 60 CAPSULE, DELAYED RELEASE ORAL at 08:13

## 2019-11-15 RX ADMIN — LISINOPRIL 30 MG: 20 TABLET ORAL at 08:13

## 2019-11-15 RX ADMIN — ALLOPURINOL 300 MG: 100 TABLET ORAL at 08:13

## 2019-11-15 NOTE — BH NOTES
PRN Medication Documentation     Specific patient behavior that led to need for PRN medication: pt requesting medication to aid to sleep  Staff interventions attempted prior to PRN being given: decreased stimuli provided food and fluids  PRN medication given: trazodone po prn  Patient response/effectiveness of PRN medication:

## 2019-11-15 NOTE — PROGRESS NOTES
Pt received resting quietly in bed, appears asleep. Respirations even and unlabored, NAD noted. Staff to continue q15 minute checks for safety.     Problem: Depressed Mood (Adult/Pediatric)  Goal: *STG: Participates in treatment plan  Outcome: Progressing Towards Goal

## 2019-11-15 NOTE — PROGRESS NOTES
Problem: Depressed Mood (Adult/Pediatric)  Goal: *STG: Participates in treatment plan  Outcome: Progressing Towards Goal  Pt participates in all therapeutic activities. Mood is euthymic.

## 2019-11-15 NOTE — DISCHARGE INSTRUCTIONS
DISCHARGE SUMMARY    NAME:Corey Jackson  : 1965  MRN: [id number]    The patient Annabelle Song exhibits the ability to control behavior in a less restrictive environment. Patient's level of functioning is improving. No assaultive/destructive behavior has been observed for the past 24 hours. No suicidal/homicidal threat or behavior has been observed for the past 24 hours. There is no evidence of serious medication side effects. Patient has not been in physical or protective restraints for at least the past 24 hours. If weapons involved, how are they secured? Wife has removed all weapons and drug paraphernalia from the home. Is patient aware of and in agreement with discharge plan? Yes    Arrangements for medication:  Prescriptions given to patient. Copy of discharge instructions to provider?: Satya Maddox ([phone]) Calros Alberto Palumbo/Carol Ann Siu ([phone])    Arrangements for transportation home:  [facility] all follow up appointments as scheduled, continue to take prescribed medications per physician instructions. Mental health crisis number:  282 or your local mental health crisis line number at [phone]. DISCHARGE SUMMARY from Nurse    PATIENT INSTRUCTIONS:    What to do at Home:  Recommended activity: Activity as tolerated,     If you experience any of the following symptoms thoughts of harming self, feeling overwhelmed with hopelessness and anxiety, please follow up with Yoel Bhatia, and your local crisis number [phone]. *  Please give a list of your current medications to your Primary Care Provider. *  Please update this list whenever your medications are discontinued, doses are      changed, or new medications (including over-the-counter products) are added. *  Please carry medication information at all times in case of emergency situations.     These are general instructions for a healthy lifestyle:    No smoking/ No tobacco products/ Avoid exposure to second hand smoke  Surgeon General's Warning:  Quitting smoking now greatly reduces serious risk to your health. Obesity, smoking, and sedentary lifestyle greatly increases your risk for illness    A healthy diet, regular physical exercise & weight monitoring are important for maintaining a healthy lifestyle    You may be retaining fluid if you have a history of heart failure or if you experience any of the following symptoms:  Weight gain of 3 pounds or more overnight or 5 pounds in a week, increased swelling in our hands or feet or shortness of breath while lying flat in bed. Please call your doctor as soon as you notice any of these symptoms; do not wait until your next office visit. The discharge information has been reviewed with the patient. The patient verbalized understanding. Discharge medications reviewed with the patient and appropriate educational materials and side effects teaching were provided.   ___________________________________________________________________________________________________________________________________

## 2019-11-15 NOTE — INTERDISCIPLINARY ROUNDS
Behavioral Health Interdisciplinary Rounds Patient Name: Chelsi Sierra  Age: 46 y.o. Room/Bed:  727/02 Primary Diagnosis: <principal problem not specified> Admission Status: Voluntary Readmission within 30 days: no 
Power of  in place: no 
Patient requires a blocked bed: no          Reason for blocked bed: VTE Prophylaxis: No 
 
Mobility needs/Fall risk: no 
Flu Vaccine : no  
Nutritional Plan: no 
Consults:       
Labs/Testing due today?: no 
 
Sleep hours: 6.5 Participation in Care/Groups:  yes Medication Compliant?: Yes PRNS (last 24 hours): antianxiety, sleep aid Restraints (last 24 hours):  no 
  
CIWA (range last 24 hours): COWS (range last 24 hours): Alcohol screening (AUDIT) completed - If applicable, date SBIRT discussed in treatment team AND documented:  
AUDIT Screen Score: Document Brief Intervention (corresponds directly with the 5 A's, Ask, Advise, Assess, Assist, and Arrange): At- Risk Patients (Score 7-15 for women; 8-15 for men) Discuss concern patient is drinking at unhealthy levels known to increase risk of alcohol-related health problems. Is Patient ready to commit to change? If No: 
? Encourage reflection ? Discuss short term and long term health risks of consuming alcohol ? Barriers to change ? Reaffirm willingness to help / Educational materials provided If Yes: 
? Set goal 
? Plan 
? Educational materials provided Harmful use or Dependence (Score 16 or greater) ? Discuss short term and long term health risks of consuming alcohol ? Recommendations ? Negotiate drinking goal 
? Recommend addiction specialist/center ? Arrange follow-up appointments. Tobacco - patient is a smoker: Have You Used Tobacco in the Past 30 Days: No 
Illegal Drugs use: Have You Used Any Illegal Substances Over the Past 12 Months: Yes 
 
24 hour chart check complete: yes Patient goal(s) for today: Prepare for discharge. Treatment team focus/goals: Reconcile medications and facilitate discharge. Progress note:  Pt is alert, oriented, calm, cooperative and psychiatrically stable for discharge. LOS:  4  Expected LOS:  4 
  
Financial concerns/prescription coverage:  [email or web address] [facility]  
Family contact: girlfriend, April ([phone]) Family requesting physician contact today: no 
Discharge plan: return home Access to weapons :   wife removed all firearms and drug paraphernalia from home.                           
Outpatient provider(s): to be linked Patient's preferred phone number for follow up call : [phone] 
  
Participating treatment team members: HANK Frazier; Aravind Jordan RN; Leana Dougherty RN
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
## 2019-11-15 NOTE — PROGRESS NOTES
Problem: Depressed Mood (Adult/Pediatric)  Goal: *STG: Participates in treatment plan  Outcome: Progressing Towards Goal  Note:   Out on unit social w peers and staff. Mood and affect bright smiling and laughing. Denies SI. Daily goal is to d/c planning and follow up care.  Staff focus is on d/c planning  Goal: *STG: Verbalizes anger, guilt, and other feelings in a constructive manor  Outcome: Progressing Towards Goal  Goal: *STG: Attends activities and groups  Outcome: Progressing Towards Goal  Goal: *STG: Demonstrates reduction in symptoms and increase in insight into coping skills/future focused  Outcome: Progressing Towards Goal  Goal: Interventions  Outcome: Progressing Towards Goal
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
## 2020-07-09 ENCOUNTER — ED HISTORICAL/CONVERTED ENCOUNTER (OUTPATIENT)
Dept: OTHER | Age: 55
End: 2020-07-09

## 2021-11-29 ENCOUNTER — APPOINTMENT (OUTPATIENT)
Dept: CT IMAGING | Age: 56
End: 2021-11-29
Attending: EMERGENCY MEDICINE
Payer: COMMERCIAL

## 2021-11-29 ENCOUNTER — HOSPITAL ENCOUNTER (EMERGENCY)
Age: 56
Discharge: HOME | End: 2021-11-29
Attending: EMERGENCY MEDICINE
Payer: COMMERCIAL

## 2021-11-29 VITALS
TEMPERATURE: 99.8 F | OXYGEN SATURATION: 99 % | WEIGHT: 264.55 LBS | HEART RATE: 91 BPM | DIASTOLIC BLOOD PRESSURE: 88 MMHG | SYSTOLIC BLOOD PRESSURE: 130 MMHG | BODY MASS INDEX: 35.06 KG/M2 | RESPIRATION RATE: 18 BRPM | HEIGHT: 73 IN

## 2021-11-29 DIAGNOSIS — Z20.822 PERSON UNDER INVESTIGATION FOR SEVERE ACUTE RESPIRATORY SYNDROME CORONAVIRUS 2 (SARS-COV-2) INFECTION: Primary | ICD-10-CM

## 2021-11-29 LAB
ALBUMIN SERPL-MCNC: 3.3 G/DL (ref 3.5–5)
ALBUMIN/GLOB SERPL: 0.8 {RATIO} (ref 1.1–2.2)
ALP SERPL-CCNC: 76 U/L (ref 45–117)
ALT SERPL-CCNC: 26 U/L (ref 12–78)
ANION GAP SERPL CALC-SCNC: 8 MMOL/L (ref 5–15)
AST SERPL-CCNC: 16 U/L (ref 15–37)
BASOPHILS # BLD: 0.2 K/UL (ref 0–0.1)
BASOPHILS NFR BLD: 1 % (ref 0–1)
BILIRUB SERPL-MCNC: 0.4 MG/DL (ref 0.2–1)
BUN SERPL-MCNC: 20 MG/DL (ref 6–20)
BUN/CREAT SERPL: 18 (ref 12–20)
CALCIUM SERPL-MCNC: 9.1 MG/DL (ref 8.5–10.1)
CHLORIDE SERPL-SCNC: 101 MMOL/L (ref 97–108)
CO2 SERPL-SCNC: 25 MMOL/L (ref 21–32)
CREAT SERPL-MCNC: 1.09 MG/DL (ref 0.7–1.3)
DEPRECATED S PYO AG THROAT QL EIA: NEGATIVE
DIFFERENTIAL METHOD BLD: ABNORMAL
EOSINOPHIL # BLD: 0 K/UL (ref 0–0.4)
EOSINOPHIL NFR BLD: 0 % (ref 0–7)
ERYTHROCYTE [DISTWIDTH] IN BLOOD BY AUTOMATED COUNT: 14.6 % (ref 11.5–14.5)
GLOBULIN SER CALC-MCNC: 4.2 G/DL (ref 2–4)
GLUCOSE SERPL-MCNC: 162 MG/DL (ref 65–100)
HCT VFR BLD AUTO: 48.8 % (ref 36.6–50.3)
HGB BLD-MCNC: 16.7 G/DL (ref 12.1–17)
IMM GRANULOCYTES # BLD AUTO: 0.2 K/UL (ref 0–0.04)
IMM GRANULOCYTES NFR BLD AUTO: 1 % (ref 0–0.5)
LYMPHOCYTES # BLD: 1.9 K/UL (ref 0.8–3.5)
LYMPHOCYTES NFR BLD: 8 % (ref 12–49)
MAGNESIUM SERPL-MCNC: 1.5 MG/DL (ref 1.6–2.4)
MCH RBC QN AUTO: 30.8 PG (ref 26–34)
MCHC RBC AUTO-ENTMCNC: 34.2 G/DL (ref 30–36.5)
MCV RBC AUTO: 90 FL (ref 80–99)
MONOCYTES # BLD: 2.3 K/UL (ref 0–1)
MONOCYTES NFR BLD: 10 % (ref 5–13)
NEUTS SEG # BLD: 18.8 K/UL (ref 1.8–8)
NEUTS SEG NFR BLD: 80 % (ref 32–75)
NRBC # BLD: 0 K/UL (ref 0–0.01)
NRBC BLD-RTO: 0 PER 100 WBC
PLATELET # BLD AUTO: 337 K/UL (ref 150–400)
PMV BLD AUTO: 9.9 FL (ref 8.9–12.9)
POTASSIUM SERPL-SCNC: 3.6 MMOL/L (ref 3.5–5.1)
PROT SERPL-MCNC: 7.5 G/DL (ref 6.4–8.2)
RBC # BLD AUTO: 5.42 M/UL (ref 4.1–5.7)
RBC MORPH BLD: ABNORMAL
SODIUM SERPL-SCNC: 134 MMOL/L (ref 136–145)
WBC # BLD AUTO: 23.4 K/UL (ref 4.1–11.1)

## 2021-11-29 PROCEDURE — 80053 COMPREHEN METABOLIC PANEL: CPT

## 2021-11-29 PROCEDURE — 99283 EMERGENCY DEPT VISIT LOW MDM: CPT

## 2021-11-29 PROCEDURE — 74177 CT ABD & PELVIS W/CONTRAST: CPT

## 2021-11-29 PROCEDURE — 83735 ASSAY OF MAGNESIUM: CPT

## 2021-11-29 PROCEDURE — 36415 COLL VENOUS BLD VENIPUNCTURE: CPT

## 2021-11-29 PROCEDURE — U0005 INFEC AGEN DETEC AMPLI PROBE: HCPCS

## 2021-11-29 PROCEDURE — 74011250637 HC RX REV CODE- 250/637: Performed by: EMERGENCY MEDICINE

## 2021-11-29 PROCEDURE — 74011250636 HC RX REV CODE- 250/636: Performed by: EMERGENCY MEDICINE

## 2021-11-29 PROCEDURE — 85025 COMPLETE CBC W/AUTO DIFF WBC: CPT

## 2021-11-29 PROCEDURE — 87880 STREP A ASSAY W/OPTIC: CPT

## 2021-11-29 PROCEDURE — 74011000636 HC RX REV CODE- 636: Performed by: EMERGENCY MEDICINE

## 2021-11-29 PROCEDURE — 87070 CULTURE OTHR SPECIMN AEROBIC: CPT

## 2021-11-29 PROCEDURE — 96374 THER/PROPH/DIAG INJ IV PUSH: CPT

## 2021-11-29 RX ORDER — DEXAMETHASONE 1 MG/1
1 TABLET ORAL 2 TIMES DAILY WITH MEALS
Qty: 6 TABLET | Refills: 0 | Status: SHIPPED | OUTPATIENT
Start: 2021-11-29 | End: 2021-12-02

## 2021-11-29 RX ORDER — ACETAMINOPHEN 325 MG/1
650 TABLET ORAL
Qty: 20 TABLET | Refills: 0 | Status: SHIPPED | OUTPATIENT
Start: 2021-11-29

## 2021-11-29 RX ORDER — KETOROLAC TROMETHAMINE 30 MG/ML
15 INJECTION, SOLUTION INTRAMUSCULAR; INTRAVENOUS
Status: COMPLETED | OUTPATIENT
Start: 2021-11-29 | End: 2021-11-29

## 2021-11-29 RX ORDER — LOPERAMIDE HYDROCHLORIDE 2 MG/1
2 CAPSULE ORAL
Status: COMPLETED | OUTPATIENT
Start: 2021-11-29 | End: 2021-11-29

## 2021-11-29 RX ADMIN — SODIUM CHLORIDE 500 ML: 9 INJECTION, SOLUTION INTRAVENOUS at 07:28

## 2021-11-29 RX ADMIN — KETOROLAC TROMETHAMINE 15 MG: 30 INJECTION, SOLUTION INTRAMUSCULAR; INTRAVENOUS at 07:28

## 2021-11-29 RX ADMIN — LOPERAMIDE HYDROCHLORIDE 2 MG: 2 CAPSULE ORAL at 07:28

## 2021-11-29 RX ADMIN — IOPAMIDOL 100 ML: 755 INJECTION, SOLUTION INTRAVENOUS at 07:59

## 2021-11-29 NOTE — ED PROVIDER NOTES
EMERGENCY DEPARTMENT HISTORY AND PHYSICAL EXAM      Date: 11/29/2021  Patient Name: Lieutenant Thomas  Patient Age and Sex: 63 y.o. 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 Patient states that since yesterday has been having sore throat, slight cough, diarrhea, fatigue, body aches and a headache. Patient states that he is unvaccinated for Covid. Does go to work so could have been exposed. Denies any productive cough, shortness of breath, chest pain, abdominal pain. States he has been having some lightheadedness with it as well. There are no other complaints, changes, or physical findings at this time. PCP: Joey Stallworth, DO    No current facility-administered medications on file prior to encounter. Current Outpatient Medications on File Prior to Encounter   Medication Sig Dispense Refill    lisinopril (PRINIVIL, ZESTRIL) 30 mg tablet Take 1 Tab by mouth daily. Indications: high blood pressure 30 Tab 0    DULoxetine (CYMBALTA) 60 mg capsule Take 1 Cap by mouth daily. Indications: Anxiousness associated with Depression 30 Cap 0    atenolol (TENORMIN) 25 mg tablet Take 25 mg by mouth nightly.  allopurinol (ZYLOPRIM) 300 mg tablet TAKE 1 TABLET BY MOUTH DAILY FOR GOUT 90 Tab 0    omeprazole-sodium bicarbonate (ZEGERID) 20-1.1 mg-gram capsule Take 1 Cap by mouth daily.  LACTOBACILLUS ACIDOPHILUS (PROBIOTIC PO) Take 1 Tab by mouth daily.          Past History     Past Medical History:  Past Medical History:   Diagnosis Date    Anxiety     Arthritis     back /gout    Asthma     Pt denies any sx currently 12/2/15    Chronic pain     Rt knee; has had cortisone shots    Diverticulitis     diverticulosis, pancreatitis    GERD (gastroesophageal reflux disease)     Gout     Right and left toes:  on preventative Rx so denies any problems    History of kidney stones     x1    Hypertension     MRSA (methicillin resistant staph aureus) culture positive 9/8/15    nares, treated with ABX    Psychiatric disorder     anxiety       Past Surgical History:  Past Surgical History:   Procedure Laterality Date    HX CERVICAL FUSION  9/22/15    C4-7    HX COLONOSCOPY      HX ORTHOPAEDIC      right knee arthroscopy and cyst removed       Family History:  Family History   Problem Relation Age of Onset    Cancer Mother         CA COLON    Hypertension Mother     Hypertension Father     Kidney Disease Father        Social History:  Social History     Tobacco Use    Smoking status: Former Smoker     Packs/day: 0.50     Years: 32.00     Pack years: 16.00     Quit date: 2009     Years since quittin.0    Smokeless tobacco: Never Used    Tobacco comment: Quit 2 yrs ago   Substance Use Topics    Alcohol use: No     Alcohol/week: 0.0 standard drinks    Drug use: Yes     Types: Marijuana, Opiates       Allergies:  No Known Allergies      Review of Systems   Review of Systems   Constitutional: Positive for chills, fatigue and fever. HENT: Positive for congestion and sore throat. Respiratory: Positive for cough. Negative for shortness of breath. Cardiovascular: Negative for chest pain. Gastrointestinal: Positive for diarrhea and nausea. Negative for abdominal pain, constipation and vomiting. Genitourinary: Negative for dysuria, frequency and hematuria. Musculoskeletal: Positive for myalgias. Neurological: Negative for weakness and numbness. All other systems reviewed and are negative. Physical Exam   Physical Exam  Vitals and nursing note reviewed. Constitutional:       Appearance: He is well-developed.    HENT:      Head: Normocephalic and atraumatic. Nose: Nose normal.      Mouth/Throat:      Mouth: Mucous membranes are moist.      Comments: Posterior pharynx is slightly erythematous with exudates noted on the tonsils  Eyes:      Extraocular Movements: Extraocular movements intact. Conjunctiva/sclera: Conjunctivae normal.   Cardiovascular:      Rate and Rhythm: Normal rate and regular rhythm. Pulmonary:      Effort: Pulmonary effort is normal. No respiratory distress. Breath sounds: Normal breath sounds. Abdominal:      General: There is no distension. Palpations: Abdomen is soft. Tenderness: There is no abdominal tenderness. Hernia: No hernia is present. Musculoskeletal:         General: Normal range of motion. Cervical back: Normal range of motion and neck supple. Skin:     General: Skin is warm and dry. Neurological:      General: No focal deficit present. Mental Status: He is alert and oriented to person, place, and time. Mental status is at baseline. Psychiatric:         Mood and Affect: Mood normal.          Diagnostic Study Results     Labs -     Recent Results (from the past 12 hour(s))   CBC WITH AUTOMATED DIFF    Collection Time: 11/29/21  6:47 AM   Result Value Ref Range    WBC 23.4 (H) 4.1 - 11.1 K/uL    RBC 5.42 4.10 - 5.70 M/uL    HGB 16.7 12.1 - 17.0 g/dL    HCT 48.8 36.6 - 50.3 %    MCV 90.0 80.0 - 99.0 FL    MCH 30.8 26.0 - 34.0 PG    MCHC 34.2 30.0 - 36.5 g/dL    RDW 14.6 (H) 11.5 - 14.5 %    PLATELET 415 865 - 570 K/uL    MPV 9.9 8.9 - 12.9 FL    NRBC 0.0 0  WBC    ABSOLUTE NRBC 0.00 0.00 - 0.01 K/uL    NEUTROPHILS 80 (H) 32 - 75 %    LYMPHOCYTES 8 (L) 12 - 49 %    MONOCYTES 10 5 - 13 %    EOSINOPHILS 0 0 - 7 %    BASOPHILS 1 0 - 1 %    IMMATURE GRANULOCYTES 1 (H) 0.0 - 0.5 %    ABS. NEUTROPHILS 18.8 (H) 1.8 - 8.0 K/UL    ABS. LYMPHOCYTES 1.9 0.8 - 3.5 K/UL    ABS. MONOCYTES 2.3 (H) 0.0 - 1.0 K/UL    ABS. EOSINOPHILS 0.0 0.0 - 0.4 K/UL    ABS.  BASOPHILS 0.2 (H) 0.0 - 0.1 K/UL    ABS. IMM. GRANS. 0.2 (H) 0.00 - 0.04 K/UL    DF SMEAR SCANNED      RBC COMMENTS NORMOCYTIC, NORMOCHROMIC     METABOLIC PANEL, COMPREHENSIVE    Collection Time: 11/29/21  6:47 AM   Result Value Ref Range    Sodium 134 (L) 136 - 145 mmol/L    Potassium 3.6 3.5 - 5.1 mmol/L    Chloride 101 97 - 108 mmol/L    CO2 25 21 - 32 mmol/L    Anion gap 8 5 - 15 mmol/L    Glucose 162 (H) 65 - 100 mg/dL    BUN 20 6 - 20 MG/DL    Creatinine 1.09 0.70 - 1.30 MG/DL    BUN/Creatinine ratio 18 12 - 20      GFR est AA >60 >60 ml/min/1.73m2    GFR est non-AA >60 >60 ml/min/1.73m2    Calcium 9.1 8.5 - 10.1 MG/DL    Bilirubin, total 0.4 0.2 - 1.0 MG/DL    ALT (SGPT) 26 12 - 78 U/L    AST (SGOT) 16 15 - 37 U/L    Alk. phosphatase 76 45 - 117 U/L    Protein, total 7.5 6.4 - 8.2 g/dL    Albumin 3.3 (L) 3.5 - 5.0 g/dL    Globulin 4.2 (H) 2.0 - 4.0 g/dL    A-G Ratio 0.8 (L) 1.1 - 2.2     MAGNESIUM    Collection Time: 11/29/21  6:47 AM   Result Value Ref Range    Magnesium 1.5 (L) 1.6 - 2.4 mg/dL   STREP AG SCREEN, GROUP A    Collection Time: 11/29/21  6:47 AM    Specimen: Swab; Throat   Result Value Ref Range    Group A Strep Ag ID Negative NEG         Radiologic Studies -   CT ABD PELV W CONT   Final Result      1. No acute inflammatory, infectious, or obstructive process is identified in   the abdomen or pelvis. 2. Mild colonic diverticulosis without evidence of diverticulitis. 3. Hepatic steatosis. 4. Degenerative changes in lumbar spine, including chronic bilateral pars   defects at L5-S1, with anterolisthesis. CT Results  (Last 48 hours)               11/29/21 0759  CT ABD PELV W CONT Final result    Impression:      1. No acute inflammatory, infectious, or obstructive process is identified in   the abdomen or pelvis. 2. Mild colonic diverticulosis without evidence of diverticulitis. 3. Hepatic steatosis.    4. Degenerative changes in lumbar spine, including chronic bilateral pars   defects at L5-S1, with anterolisthesis. Narrative:  EXAM: CT ABD PELV W CONT       INDICATION: diarrhea wbc 23K       COMPARISON: CT abdomen pelvis September 3, 2019        CONTRAST: 100 mL of Isovue-370. TECHNIQUE:    Following the uneventful intravenous administration of contrast, thin axial   images were obtained through the abdomen and pelvis. Coronal and sagittal   reconstructions were generated. Oral contrast was not administered. CT dose   reduction was achieved through use of a standardized protocol tailored for this   examination and automatic exposure control for dose modulation. FINDINGS:    LOWER THORAX: Hepatic steatosis. LIVER: No mass. BILIARY TREE: Gallbladder is within normal limits. CBD is not dilated. SPLEEN: within normal limits. PANCREAS: No mass or ductal dilatation. ADRENALS: Unremarkable. KIDNEYS: Multiple tiny hypodensities in the left kidney are too small to fully   characterize but most likely reflect simple cysts. STOMACH: Unremarkable. SMALL BOWEL: No dilatation or wall thickening. COLON: No dilatation or wall thickening. Mild sigmoid and descending colon   diverticulosis without evidence of diverticulitis. APPENDIX: Normal appendix. PERITONEUM: No ascites or pneumoperitoneum. RETROPERITONEUM: No lymphadenopathy or aortic aneurysm. Incidentally noted   duplicated IVC. REPRODUCTIVE ORGANS: Normal prostate. URINARY BLADDER: No mass or calculus. BONES: Anterolisthesis of L5 on S1, with chronic bilateral pars defects. Other   degenerative changes are noted throughout the lumbar spine. ABDOMINAL WALL: No mass or hernia. ADDITIONAL COMMENTS: N/A               CXR Results  (Last 48 hours)    None            Medical Decision Making   I am the first provider for this patient. I reviewed the vital signs, available nursing notes, past medical history, past surgical history, family history and social history. Vital Signs-Reviewed the patient's vital signs.   Patient Vitals for the past 12 hrs:   Temp Pulse Resp BP SpO2   11/29/21 0549 99.8 °F (37.7 °C) 91 18 130/88 99 %       Records Reviewed: Nursing Notes and Old Medical Records    Provider Notes (Medical Decision Making):   Patient presenting for sore throat with some exudates on the tonsils, diarrhea, fevers, fatigue, myalgias, headache. Differential includes COVID-19 infection, other viral illness or URI, strep throat, dehydration. Less likely bacterial cause given the many different symptoms. We will plan to get labs, treat his symptoms. ED Course:   Initial assessment performed. The patients presenting problems have been discussed, and they are in agreement with the care plan formulated and outlined with them. I have encouraged them to ask questions as they arise throughout their visit. ED Course as of 11/29/21 1304   Mon Nov 29, 2021   8158 Patient's white count very elevated at 23,000 so plan will be to get CT of the abdomen to make sure no colitis or diverticulitis. Patient states that he has a history of diverticulitis but this feels different. No abdominal pain. [JS]      ED Course User Index  [JS] Mary Jean MD     Critical Care Time:   0    Disposition:  Discharge Note:  The patient has been re-evaluated and is ready for discharge. Reviewed available results with patient. Counseled patient on diagnosis and care plan. Patient has expressed understanding, and all questions have been answered. Patient agrees with plan and agrees to follow up as recommended, or to return to the ED if their symptoms worsen. Discharge instructions have been provided and explained to the patient, along with reasons to return to the ED. PLAN:  Discharge Medication List as of 11/29/2021  9:04 AM      START taking these medications    Details   dexAMETHasone (DECADRON) 1 mg tablet Take 1 Tablet by mouth two (2) times daily (with meals) for 3 days. , Normal, Disp-6 Tablet, R-0      acetaminophen (TYLENOL) 325 mg tablet Take 2 Tablets by mouth every six (6) hours as needed for Pain or Fever., Normal, Disp-20 Tablet, R-0         CONTINUE these medications which have NOT CHANGED    Details   lisinopril (PRINIVIL, ZESTRIL) 30 mg tablet Take 1 Tab by mouth daily. Indications: high blood pressure, Print, Disp-30 Tab, R-0      DULoxetine (CYMBALTA) 60 mg capsule Take 1 Cap by mouth daily. Indications: Anxiousness associated with Depression, Print, Disp-30 Cap, R-0      atenolol (TENORMIN) 25 mg tablet Take 25 mg by mouth nightly., Historical Med      allopurinol (ZYLOPRIM) 300 mg tablet TAKE 1 TABLET BY MOUTH DAILY FOR GOUT, Normal, Disp-90 Tab, R-0      omeprazole-sodium bicarbonate (ZEGERID) 20-1.1 mg-gram capsule Take 1 Cap by mouth daily. , Historical Med      LACTOBACILLUS ACIDOPHILUS (PROBIOTIC PO) Take 1 Tab by mouth daily. , Historical Med           2. Follow-up Information     Follow up With Specialties Details Why Contact Annita Pedro, DO Family Medicine  About your persistent elevated  [facility]  [phone]          3. Return to ED if worse     Diagnosis     Clinical Impression:   1. Person under investigation for severe acute respiratory syndrome coronavirus 2 (SARS-CoV-2) infection        Attestations:    Jenny Haque M.D. Please note that this dictation was completed with [employer], the computer voice recognition software. Quite often unanticipated grammatical, syntax, homophones, and other interpretive errors are inadvertently transcribed by the computer software. Please disregard these errors. Please excuse any errors that have escaped final proofreading. Thank you.

## 2021-11-29 NOTE — Clinical Note
[address]  [facility] EMERGENCY DEPT  [address]  [place] [address]  [phone]    Work/School Note    Date: 11/29/2021     To Whom It May concern:    Nani Primrose was evaluated by the following provider(s):  Attending Provider: Humble Rooney MD.   [facility] virus is suspected. Per the CDC guidelines we recommend home isolation until the following conditions are all met:    1. At least 10 days have passed since symptoms first appeared and  2. At least 24 hours have passed since last fever without the use of fever-reducing medications and  3.  Symptoms (e.g., cough, shortness of breath) have improved      Sincerely,          Lisa Andres MD

## 2021-11-29 NOTE — ED NOTES
Ambulatory out of ER, no acute distress noted. DC plan reviewed with Dr. Raimundo Corral, papers in hand.

## 2021-11-30 ENCOUNTER — PATIENT OUTREACH (OUTPATIENT)
Dept: CASE MANAGEMENT | Age: 56
End: 2021-11-30

## 2021-11-30 LAB
SARS-COV-2, XPLCVT: NOT DETECTED
SOURCE, COVRS: NORMAL

## 2021-11-30 NOTE — PROGRESS NOTES
Patient contacted regarding COVID-19 risk. Discussed COVID-19 related testing which was available at this time. Test results were negative. Patient informed of results, if available? yes. Ambulatory Care Manager contacted the patient by telephone to perform post discharge assessment. Call within 2 business days of discharge: Yes Verified name and  with patient as identifiers. Provided introduction to self, and explanation of the CTN/ACM role, and reason for call due to risk factors for infection and/or exposure to COVID-19. Symptoms reviewed with patient who verbalized the following symptoms: pain or aching joints and diarrhea      Due to no new or worsening symptoms encounter was not routed to provider for escalation. Discussed follow-up appointments. If no appointment was previously scheduled, appointment scheduling offered:  Patient will follow up with PCP as needed. Advance Care Planning:   Does patient have an Advance Directive: not on file. Educated patient about risk for severe COVID-19 due to risk factors according to CDC guidelines. ACM reviewed discharge instructions, medical action plan and red flag symptoms with the patient who verbalized understanding. Discussed COVID vaccination status: yes. Education provided on COVID-19 vaccination as appropriate. Discussed exposure protocols and quarantine with CDC Guidelines. Patient was given an opportunity to verbalize any questions and concerns and agrees to contact ACM or health care provider for questions related to their healthcare. Reviewed and educated patient on any new and changed medications related to discharge diagnosis     Was patient discharged with a pulse oximeter? no     ACM provided contact information. Plan for follow-up call in 5-7 days based on severity of symptoms and risk factors.

## 2021-12-01 LAB
BACTERIA SPEC CULT: NORMAL
SERVICE CMNT-IMP: NORMAL

## 2021-12-07 ENCOUNTER — PATIENT OUTREACH (OUTPATIENT)
Dept: CASE MANAGEMENT | Age: 56
End: 2021-12-07

## 2021-12-07 NOTE — PROGRESS NOTES
Patient resolved from [address] Transitions episode on 12/07/21  . Discussed COVID-19 related testing which was available at this time. Test results were negative. Patient informed of results, if available? yes     Patient/family has been provided the following resources and education related to COVID-19:                         Signs, symptoms and red flags related to COVID-19            [facility] exposure and quarantine guidelines            [facility] exposure contact - [phone]            Contact for their local Department of Health                 Patient currently reports that the following symptoms have improved:  diarrhea, no new symptoms and states all symptoms are better. .    No further outreach scheduled with this CTN/ACM/LPN/HC/ MA. Episode of Care resolved. Patient has this CTN/ACM/LPN/HC/MA contact information if future needs arise.

## 2022-01-10 ENCOUNTER — APPOINTMENT (OUTPATIENT)
Dept: CT IMAGING | Age: 57
End: 2022-01-10
Attending: EMERGENCY MEDICINE
Payer: COMMERCIAL

## 2022-01-10 ENCOUNTER — HOSPITAL ENCOUNTER (EMERGENCY)
Age: 57
Discharge: HOME | End: 2022-01-10
Attending: EMERGENCY MEDICINE
Payer: COMMERCIAL

## 2022-01-10 VITALS
OXYGEN SATURATION: 99 % | RESPIRATION RATE: 18 BRPM | SYSTOLIC BLOOD PRESSURE: 148 MMHG | DIASTOLIC BLOOD PRESSURE: 109 MMHG | WEIGHT: 261.25 LBS | HEART RATE: 90 BPM | TEMPERATURE: 97.6 F | HEIGHT: 73 IN | BODY MASS INDEX: 34.62 KG/M2

## 2022-01-10 DIAGNOSIS — K52.9 COLITIS: ICD-10-CM

## 2022-01-10 DIAGNOSIS — D72.829 LEUKOCYTOSIS, UNSPECIFIED TYPE: Primary | ICD-10-CM

## 2022-01-10 LAB
ALBUMIN SERPL-MCNC: 3.9 G/DL (ref 3.5–5)
ALBUMIN/GLOB SERPL: 0.8 {RATIO} (ref 1.1–2.2)
ALP SERPL-CCNC: 89 U/L (ref 45–117)
ALT SERPL-CCNC: 25 U/L (ref 12–78)
ANION GAP SERPL CALC-SCNC: 4 MMOL/L (ref 5–15)
APPEARANCE UR: CLEAR
AST SERPL-CCNC: 16 U/L (ref 15–37)
BACTERIA URNS QL MICRO: NEGATIVE /HPF
BILIRUB SERPL-MCNC: 0.8 MG/DL (ref 0.2–1)
BILIRUB UR QL CFM: NEGATIVE
BUN SERPL-MCNC: 17 MG/DL (ref 6–20)
BUN/CREAT SERPL: 17 (ref 12–20)
CALCIUM SERPL-MCNC: 10 MG/DL (ref 8.5–10.1)
CHLORIDE SERPL-SCNC: 98 MMOL/L (ref 97–108)
CO2 SERPL-SCNC: 32 MMOL/L (ref 21–32)
COLOR UR: ABNORMAL
CREAT SERPL-MCNC: 1 MG/DL (ref 0.7–1.3)
EPITH CASTS URNS QL MICRO: ABNORMAL /LPF
ERYTHROCYTE [DISTWIDTH] IN BLOOD BY AUTOMATED COUNT: 14 % (ref 11.5–14.5)
GLOBULIN SER CALC-MCNC: 5.2 G/DL (ref 2–4)
GLUCOSE SERPL-MCNC: 117 MG/DL (ref 65–100)
GLUCOSE UR STRIP.AUTO-MCNC: NEGATIVE MG/DL
HCT VFR BLD AUTO: 49.1 % (ref 36.6–50.3)
HGB BLD-MCNC: 16.5 G/DL (ref 12.1–17)
HGB UR QL STRIP: NEGATIVE
HYALINE CASTS URNS QL MICRO: ABNORMAL /LPF (ref 0–5)
KETONES UR QL STRIP.AUTO: NEGATIVE MG/DL
LEUKOCYTE ESTERASE UR QL STRIP.AUTO: NEGATIVE
LIPASE SERPL-CCNC: 68 U/L (ref 73–393)
MCH RBC QN AUTO: 31 PG (ref 26–34)
MCHC RBC AUTO-ENTMCNC: 33.6 G/DL (ref 30–36.5)
MCV RBC AUTO: 92.1 FL (ref 80–99)
NITRITE UR QL STRIP.AUTO: NEGATIVE
NRBC # BLD: 0 K/UL (ref 0–0.01)
NRBC BLD-RTO: 0 PER 100 WBC
PH UR STRIP: 6.5 [PH] (ref 5–8)
PLATELET # BLD AUTO: 429 K/UL (ref 150–400)
PMV BLD AUTO: 9.9 FL (ref 8.9–12.9)
POTASSIUM SERPL-SCNC: 3.8 MMOL/L (ref 3.5–5.1)
PROT SERPL-MCNC: 9.1 G/DL (ref 6.4–8.2)
PROT UR STRIP-MCNC: ABNORMAL MG/DL
RBC # BLD AUTO: 5.33 M/UL (ref 4.1–5.7)
RBC #/AREA URNS HPF: ABNORMAL /HPF (ref 0–5)
SODIUM SERPL-SCNC: 134 MMOL/L (ref 136–145)
SP GR UR REFRACTOMETRY: 1.02 (ref 1–1.03)
UA: UC IF INDICATED,UAUC: ABNORMAL
UROBILINOGEN UR QL STRIP.AUTO: 1 EU/DL (ref 0.2–1)
WBC # BLD AUTO: 22.9 K/UL (ref 4.1–11.1)
WBC URNS QL MICRO: ABNORMAL /HPF (ref 0–4)

## 2022-01-10 PROCEDURE — 74011250636 HC RX REV CODE- 250/636: Performed by: EMERGENCY MEDICINE

## 2022-01-10 PROCEDURE — 80053 COMPREHEN METABOLIC PANEL: CPT

## 2022-01-10 PROCEDURE — 74011000636 HC RX REV CODE- 636: Performed by: EMERGENCY MEDICINE

## 2022-01-10 PROCEDURE — 83690 ASSAY OF LIPASE: CPT

## 2022-01-10 PROCEDURE — 99283 EMERGENCY DEPT VISIT LOW MDM: CPT

## 2022-01-10 PROCEDURE — 74011250637 HC RX REV CODE- 250/637: Performed by: EMERGENCY MEDICINE

## 2022-01-10 PROCEDURE — 85027 COMPLETE CBC AUTOMATED: CPT

## 2022-01-10 PROCEDURE — 36415 COLL VENOUS BLD VENIPUNCTURE: CPT

## 2022-01-10 PROCEDURE — 96375 TX/PRO/DX INJ NEW DRUG ADDON: CPT

## 2022-01-10 PROCEDURE — 74011000258 HC RX REV CODE- 258: Performed by: EMERGENCY MEDICINE

## 2022-01-10 PROCEDURE — 81001 URINALYSIS AUTO W/SCOPE: CPT

## 2022-01-10 PROCEDURE — 96365 THER/PROPH/DIAG IV INF INIT: CPT

## 2022-01-10 PROCEDURE — 74177 CT ABD & PELVIS W/CONTRAST: CPT

## 2022-01-10 RX ORDER — METRONIDAZOLE 250 MG/1
500 TABLET ORAL
Status: COMPLETED | OUTPATIENT
Start: 2022-01-10 | End: 2022-01-10

## 2022-01-10 RX ORDER — METRONIDAZOLE 500 MG/1
500 TABLET ORAL 2 TIMES DAILY
Qty: 20 TABLET | Refills: 0 | Status: SHIPPED | OUTPATIENT
Start: 2022-01-10 | End: 2022-01-20

## 2022-01-10 RX ORDER — KETOROLAC TROMETHAMINE 30 MG/ML
15 INJECTION, SOLUTION INTRAMUSCULAR; INTRAVENOUS
Status: COMPLETED | OUTPATIENT
Start: 2022-01-10 | End: 2022-01-10

## 2022-01-10 RX ORDER — ONDANSETRON 2 MG/ML
4 INJECTION INTRAMUSCULAR; INTRAVENOUS
Status: COMPLETED | OUTPATIENT
Start: 2022-01-10 | End: 2022-01-10

## 2022-01-10 RX ORDER — CIPROFLOXACIN 500 MG/1
500 TABLET ORAL 2 TIMES DAILY
Qty: 20 TABLET | Refills: 0 | Status: SHIPPED | OUTPATIENT
Start: 2022-01-10 | End: 2022-01-20

## 2022-01-10 RX ADMIN — CEFTRIAXONE SODIUM 1 G: 1 INJECTION, POWDER, FOR SOLUTION INTRAMUSCULAR; INTRAVENOUS at 12:03

## 2022-01-10 RX ADMIN — KETOROLAC TROMETHAMINE 15 MG: 30 INJECTION, SOLUTION INTRAMUSCULAR at 12:04

## 2022-01-10 RX ADMIN — IOPAMIDOL 100 ML: 755 INJECTION, SOLUTION INTRAVENOUS at 14:11

## 2022-01-10 RX ADMIN — ONDANSETRON 4 MG: 2 INJECTION INTRAMUSCULAR; INTRAVENOUS at 12:04

## 2022-01-10 RX ADMIN — METRONIDAZOLE 500 MG: 250 TABLET ORAL at 12:04

## 2022-01-10 NOTE — ED PROVIDER NOTES
EMERGENCY DEPARTMENT HISTORY AND PHYSICAL EXAM      Date: 1/10/2022  Patient Name: Una Horne  Patient Age and Sex: 61 y.o. male     History of Presenting Illness     Chief Complaint   Patient presents with    Abdominal Pain     lower left abdominal pain onset saturday evening' started as bad diarrhea; pain radiates to middle lower abdomen.  Flank Pain       History Provided By: Patient    HPI: Una Horne is a 48-year-old male with a history of diverticulitis presenting with left lower quadrant abdominal pain. Patient states that since Saturday started having diarrhea and then started having left lower quadrant abdominal pain. Patient states that the pain comes and goes and is associated with bad diarrhea. No blood in his stool that he is noticed. Has been associated with nausea and vomiting as well. Denies any fevers, urinary symptoms. There are no other complaints, changes, or physical findings at this time. PCP: Leah Cano, DO    No current facility-administered medications on file prior to encounter. Current Outpatient Medications on File Prior to Encounter   Medication Sig Dispense Refill    acetaminophen (TYLENOL) 325 mg tablet Take 2 Tablets by mouth every six (6) hours as needed for Pain or Fever. 20 Tablet 0    lisinopril (PRINIVIL, ZESTRIL) 30 mg tablet Take 1 Tab by mouth daily. Indications: high blood pressure 30 Tab 0    DULoxetine (CYMBALTA) 60 mg capsule Take 1 Cap by mouth daily. Indications: Anxiousness associated with Depression 30 Cap 0    atenolol (TENORMIN) 25 mg tablet Take 25 mg by mouth nightly.  allopurinol (ZYLOPRIM) 300 mg tablet TAKE 1 TABLET BY MOUTH DAILY FOR GOUT 90 Tab 0    omeprazole-sodium bicarbonate (ZEGERID) 20-1.1 mg-gram capsule Take 1 Cap by mouth daily.  LACTOBACILLUS ACIDOPHILUS (PROBIOTIC PO) Take 1 Tab by mouth daily.          Past History     Past Medical History:  Past Medical History:   Diagnosis Date    Anxiety  Arthritis     back /gout    Asthma     Pt denies any sx currently 12/2/15    Chronic pain     Rt knee; has had cortisone shots    Diverticulitis     diverticulosis, pancreatitis    GERD (gastroesophageal reflux disease)     Gout     Right and left toes:  on preventative Rx so denies any problems    History of kidney stones     x1    Hypertension     MRSA (methicillin resistant staph aureus) culture positive 9/8/15    nares, treated with ABX    Psychiatric disorder     anxiety       Past Surgical History:  Past Surgical History:   Procedure Laterality Date    HX CERVICAL FUSION  9/22/15    C4-7    HX COLONOSCOPY      HX ORTHOPAEDIC      right knee arthroscopy and cyst removed       Family History:  Family History   Problem Relation Age of Onset    Cancer Mother         CA COLON    Hypertension Mother     Hypertension Father     Kidney Disease Father        Social History:  Social History     Tobacco Use    Smoking status: Former Smoker     Packs/day: 0.50     Years: 32.00     Pack years: 16.00     Quit date: 2009     Years since quittin.1    Smokeless tobacco: Never Used    Tobacco comment: Quit 2 yrs ago   Substance Use Topics    Alcohol use: No     Alcohol/week: 0.0 standard drinks    Drug use: Yes     Types: Marijuana, Opiates       Allergies:  No Known Allergies      Review of Systems   Review of Systems   Constitutional: Negative for chills and fever. Respiratory: Negative for cough and shortness of breath. Cardiovascular: Negative for chest pain. Gastrointestinal: Positive for abdominal pain, diarrhea, nausea and vomiting. Negative for constipation. Genitourinary: Negative for dysuria, frequency and hematuria. Neurological: Negative for weakness and numbness. All other systems reviewed and are negative. Physical Exam   Physical Exam  Vitals and nursing note reviewed. Constitutional:       Appearance: He is well-developed.    HENT:      Head: Normocephalic and atraumatic. Nose: Nose normal.      Mouth/Throat:      Mouth: Mucous membranes are moist.   Eyes:      Extraocular Movements: Extraocular movements intact. Conjunctiva/sclera: Conjunctivae normal.   Cardiovascular:      Rate and Rhythm: Normal rate and regular rhythm. Pulmonary:      Effort: Pulmonary effort is normal. No respiratory distress. Breath sounds: Normal breath sounds. Abdominal:      General: There is no distension. Palpations: Abdomen is soft. Tenderness: There is abdominal tenderness in the left lower quadrant. Musculoskeletal:         General: Normal range of motion. Cervical back: Normal range of motion and neck supple. Skin:     General: Skin is warm and dry. Neurological:      General: No focal deficit present. Mental Status: He is alert and oriented to person, place, and time. Mental status is at baseline. Psychiatric:         Mood and Affect: Mood normal.          Diagnostic Study Results     Labs -     Recent Results (from the past 12 hour(s))   METABOLIC PANEL, COMPREHENSIVE    Collection Time: 01/10/22 11:38 AM   Result Value Ref Range    Sodium 134 (L) 136 - 145 mmol/L    Potassium 3.8 3.5 - 5.1 mmol/L    Chloride 98 97 - 108 mmol/L    CO2 32 21 - 32 mmol/L    Anion gap 4 (L) 5 - 15 mmol/L    Glucose 117 (H) 65 - 100 mg/dL    BUN 17 6 - 20 MG/DL    Creatinine 1.00 0.70 - 1.30 MG/DL    BUN/Creatinine ratio 17 12 - 20      GFR est AA >60 >60 ml/min/1.73m2    GFR est non-AA >60 >60 ml/min/1.73m2    Calcium 10.0 8.5 - 10.1 MG/DL    Bilirubin, total 0.8 0.2 - 1.0 MG/DL    ALT (SGPT) 25 12 - 78 U/L    AST (SGOT) 16 15 - 37 U/L    Alk.  phosphatase 89 45 - 117 U/L    Protein, total 9.1 (H) 6.4 - 8.2 g/dL    Albumin 3.9 3.5 - 5.0 g/dL    Globulin 5.2 (H) 2.0 - 4.0 g/dL    A-G Ratio 0.8 (L) 1.1 - 2.2     LIPASE    Collection Time: 01/10/22 11:38 AM   Result Value Ref Range    Lipase 68 (L) 73 - 393 U/L   URINALYSIS W/ REFLEX CULTURE    Collection Time: 01/10/22 11:38 AM    Specimen: Miscellaneous sample; Urine    Urine specimen   Result Value Ref Range    Color DARK YELLOW      Appearance CLEAR CLEAR      Specific gravity 1.023 1.003 - 1.030      pH (UA) 6.5 5.0 - 8.0      Protein TRACE (A) NEG mg/dL    Glucose Negative NEG mg/dL    Ketone Negative NEG mg/dL    Blood Negative NEG      Urobilinogen 1.0 0.2 - 1.0 EU/dL    Nitrites Negative NEG      Leukocyte Esterase Negative NEG      WBC 0-4 0 - 4 /hpf    RBC 0-5 0 - 5 /hpf    Epithelial cells FEW FEW /lpf    Bacteria Negative NEG /hpf    UA:UC IF INDICATED CULTURE NOT INDICATED BY UA RESULT CNI      Hyaline cast 2-5 0 - 5 /lpf   CBC W/O DIFF    Collection Time: 01/10/22 11:38 AM   Result Value Ref Range    WBC 22.9 (H) 4.1 - 11.1 K/uL    RBC 5.33 4.10 - 5.70 M/uL    HGB 16.5 12.1 - 17.0 g/dL    HCT 49.1 36.6 - 50.3 %    MCV 92.1 80.0 - 99.0 FL    MCH 31.0 26.0 - 34.0 PG    MCHC 33.6 30.0 - 36.5 g/dL    RDW 14.0 11.5 - 14.5 %    PLATELET 307 (H) 185 - 400 K/uL    MPV 9.9 8.9 - 12.9 FL    NRBC 0.0 0  WBC    ABSOLUTE NRBC 0.00 0.00 - 0.01 K/uL   BILIRUBIN, CONFIRM    Collection Time: 01/10/22 11:38 AM   Result Value Ref Range    Bilirubin UA, confirm Negative NEG         Radiologic Studies -   CT ABD PELV W CONT   Final Result      1. Moderate distal descending colon colitis. No obstruction or abscess. 2. Possible hepatic steatosis. CT Results  (Last 48 hours)               01/10/22 1411  CT ABD PELV W CONT Final result    Impression:      1. Moderate distal descending colon colitis. No obstruction or abscess. 2. Possible hepatic steatosis. Narrative:  EXAM: CT ABD PELV W CONT       INDICATION: Left lower quadrant abdominal pain, previous diverticulitis. COMPARISON: CT abdomen/pelvis on 11/29/2021 and 9/3/2019. CONTRAST: 100 mL of Isovue-370.        TECHNIQUE:    Following the uneventful intravenous administration of contrast, thin axial   images were obtained through the abdomen and pelvis. Coronal and sagittal   reconstructions were generated. Oral contrast was not administered. CT dose   reduction was achieved through use of a standardized protocol tailored for this   examination and automatic exposure control for dose modulation. FINDINGS:    LOWER THORAX: No significant abnormality in the incidentally imaged lower chest.   LIVER: Hypoenhancing may represent hepatic steatosis. Normal size and smooth   surface. No mass. BILIARY TREE: Gallbladder is within normal limits. CBD is not dilated. SPLEEN: within normal limits. PANCREAS: Mild atrophy. No inflammation or mass. ADRENALS: Unremarkable. KIDNEYS: No mass, calculus, or hydronephrosis. STOMACH: Unremarkable. SMALL BOWEL: No dilatation or wall thickening. COLON: Colonic diverticulosis is still present. An inflamed diverticulum is   posterior in the distal descending colon. Surrounding fat inflammation and   thickened left combined interfascial plane. No abscess. APPENDIX: Normal.   PERITONEUM: No ascites or pneumoperitoneum. RETROPERITONEUM: No lymphadenopathy or aortic aneurysm. REPRODUCTIVE ORGANS: Prostate gland is mildly heterogeneous and contains   calcifications. URINARY BLADDER: No mass or calculus. BONES: Grade 1 retrolisthesis of L1-L2 and L4-L5. Grade 1 anterolisthesis of   L5-S1. Bilateral L5 spondylolysis. Moderate facet arthrosis L4-S1. ABDOMINAL WALL: No mass or hernia. ADDITIONAL COMMENTS: N/A               CXR Results  (Last 48 hours)    None            Medical Decision Making   I am the first provider for this patient. I reviewed the vital signs, available nursing notes, past medical history, past surgical history, family history and social history. Vital Signs-Reviewed the patient's vital signs.   Patient Vitals for the past 12 hrs:   Temp Pulse Resp BP SpO2   01/10/22 1410  90      01/10/22 1038 97.6 °F (36.4 °C) (!) 105 18 (!) 148/109 99 %       Records Reviewed: Nursing Notes and Old Medical Records    Provider Notes (Medical Decision Making):   Patient presenting with left lower quadrant abdominal pain in association with diarrhea nausea vomiting. States that it feels very much like his prior diverticulitis. Currently not febrile. Does appear to be in pain so we will give him analgesics as well as antiemetics. We will get labs and consider CT versus empirically treating him for diverticulitis. ED Course:   Initial assessment performed. The patients presenting problems have been discussed, and they are in agreement with the care plan formulated and outlined with them. I have encouraged them to ask questions as they arise throughout their visit. ED Course as of 01/10/22 1616   Mon Flip 10, 2022   1227 Patient's white count significantly elevated at 23,000. However looking back he always has an elevated white count and every time he has been here the white count has been increasing and increasing. CT from November 2021 was negative for diverticulitis. His symptoms sound very much like diverticulitis but with that white count we will go ahead and repeat the CT. Even if negative, he will have to follow-up with oncology about his leukocytosis. [JS]      ED Course User Index  [JS] Tucker Castro MD     Critical Care Time:   0    Disposition:  Discharge Note:  The patient has been re-evaluated and is ready for discharge. Reviewed available results with patient. Counseled patient on diagnosis and care plan. Patient has expressed understanding, and all questions have been answered. Patient agrees with plan and agrees to follow up as recommended, or to return to the ED if their symptoms worsen. Discharge instructions have been provided and explained to the patient, along with reasons to return to the ED. PLAN:  Discharge Medication List as of 1/10/2022  2:34 PM        2.    Follow-up Information     Follow up With Specialties Details Why Contact Info    Anais Serna Libby Alfredo, DO Family Medicine  As needed [address] [address]  [phone]      Isabelle Ortez MD Hematology and Oncology, Hematology, Oncology Schedule an appointment as soon as possible for a visit  about elevated white counts [address]  [address]  [facility]  [phone]          3. Return to ED if worse     Diagnosis     Clinical Impression:   1. Leukocytosis, unspecified type    2. Colitis        Attestations:    Sara Hughes M.D. Please note that this dictation was completed with [employer], the [employer] voice recognition software. Quite often unanticipated grammatical, syntax, homophones, and other interpretive errors are inadvertently transcribed by the computer software. Please disregard these errors. Please excuse any errors that have escaped final proofreading. Thank you.

## 2022-03-18 PROBLEM — E66.9 OBESITY (BMI 30-39.9): Status: ACTIVE | Noted: 2017-08-04

## 2022-03-19 PROBLEM — F32.9 MAJOR DEPRESSIVE DISORDER: Status: ACTIVE | Noted: 2019-11-11

## 2022-08-19 ENCOUNTER — HOSPITAL ENCOUNTER (EMERGENCY)
Age: 57
Discharge: HOME | End: 2022-08-19
Attending: EMERGENCY MEDICINE
Payer: COMMERCIAL

## 2022-08-19 VITALS
WEIGHT: 259.04 LBS | OXYGEN SATURATION: 97 % | SYSTOLIC BLOOD PRESSURE: 113 MMHG | HEART RATE: 67 BPM | DIASTOLIC BLOOD PRESSURE: 69 MMHG | TEMPERATURE: 98.5 F | BODY MASS INDEX: 34.18 KG/M2 | RESPIRATION RATE: 16 BRPM

## 2022-08-19 DIAGNOSIS — M79.605 BILATERAL LEG PAIN: Primary | ICD-10-CM

## 2022-08-19 DIAGNOSIS — M79.604 BILATERAL LEG PAIN: Primary | ICD-10-CM

## 2022-08-19 LAB
ALBUMIN SERPL-MCNC: 3.4 G/DL (ref 3.5–5)
ALBUMIN/GLOB SERPL: 0.9 {RATIO} (ref 1.1–2.2)
ALP SERPL-CCNC: 70 U/L (ref 45–117)
ALT SERPL-CCNC: 27 U/L (ref 12–78)
ANION GAP SERPL CALC-SCNC: 9 MMOL/L (ref 5–15)
APPEARANCE UR: CLEAR
AST SERPL-CCNC: 32 U/L (ref 15–37)
BACTERIA URNS QL MICRO: NEGATIVE /HPF
BASOPHILS # BLD: 0.1 K/UL (ref 0–0.1)
BASOPHILS NFR BLD: 1 % (ref 0–1)
BILIRUB SERPL-MCNC: 0.5 MG/DL (ref 0.2–1)
BILIRUB UR QL: NEGATIVE
BUN SERPL-MCNC: 22 MG/DL (ref 6–20)
BUN/CREAT SERPL: 20 (ref 12–20)
CALCIUM SERPL-MCNC: 8.9 MG/DL (ref 8.5–10.1)
CHLORIDE SERPL-SCNC: 102 MMOL/L (ref 97–108)
CK SERPL-CCNC: 261 U/L (ref 39–308)
CO2 SERPL-SCNC: 26 MMOL/L (ref 21–32)
COLOR UR: ABNORMAL
CREAT SERPL-MCNC: 1.11 MG/DL (ref 0.7–1.3)
DIFFERENTIAL METHOD BLD: NORMAL
EOSINOPHIL # BLD: 0.3 K/UL (ref 0–0.4)
EOSINOPHIL NFR BLD: 3 % (ref 0–7)
EPITH CASTS URNS QL MICRO: ABNORMAL /LPF
ERYTHROCYTE [DISTWIDTH] IN BLOOD BY AUTOMATED COUNT: 14.3 % (ref 11.5–14.5)
GLOBULIN SER CALC-MCNC: 4 G/DL (ref 2–4)
GLUCOSE SERPL-MCNC: 96 MG/DL (ref 65–100)
GLUCOSE UR STRIP.AUTO-MCNC: NEGATIVE MG/DL
HCT VFR BLD AUTO: 40.7 % (ref 36.6–50.3)
HGB BLD-MCNC: 13.7 G/DL (ref 12.1–17)
HGB UR QL STRIP: NEGATIVE
IMM GRANULOCYTES # BLD AUTO: 0 K/UL (ref 0–0.04)
IMM GRANULOCYTES NFR BLD AUTO: 0 % (ref 0–0.5)
KETONES UR QL STRIP.AUTO: NEGATIVE MG/DL
LEUKOCYTE ESTERASE UR QL STRIP.AUTO: NEGATIVE
LYMPHOCYTES # BLD: 2.9 K/UL (ref 0.8–3.5)
LYMPHOCYTES NFR BLD: 30 % (ref 12–49)
MCH RBC QN AUTO: 31.1 PG (ref 26–34)
MCHC RBC AUTO-ENTMCNC: 33.7 G/DL (ref 30–36.5)
MCV RBC AUTO: 92.5 FL (ref 80–99)
MONOCYTES # BLD: 1 K/UL (ref 0–1)
MONOCYTES NFR BLD: 11 % (ref 5–13)
MUCOUS THREADS URNS QL MICRO: ABNORMAL /LPF
NEUTS SEG # BLD: 5.3 K/UL (ref 1.8–8)
NEUTS SEG NFR BLD: 56 % (ref 32–75)
NITRITE UR QL STRIP.AUTO: NEGATIVE
NRBC # BLD: 0 K/UL (ref 0–0.01)
NRBC BLD-RTO: 0 PER 100 WBC
PH UR STRIP: 5.5 [PH] (ref 5–8)
PLATELET # BLD AUTO: 346 K/UL (ref 150–400)
PMV BLD AUTO: 9.7 FL (ref 8.9–12.9)
POTASSIUM SERPL-SCNC: 4.1 MMOL/L (ref 3.5–5.1)
PROT SERPL-MCNC: 7.4 G/DL (ref 6.4–8.2)
PROT UR STRIP-MCNC: NEGATIVE MG/DL
RBC # BLD AUTO: 4.4 M/UL (ref 4.1–5.7)
RBC #/AREA URNS HPF: ABNORMAL /HPF (ref 0–5)
SODIUM SERPL-SCNC: 137 MMOL/L (ref 136–145)
SP GR UR REFRACTOMETRY: 1.03 (ref 1–1.03)
UROBILINOGEN UR QL STRIP.AUTO: 0.2 EU/DL (ref 0.2–1)
WBC # BLD AUTO: 9.5 K/UL (ref 4.1–11.1)
WBC URNS QL MICRO: ABNORMAL /HPF (ref 0–4)

## 2022-08-19 PROCEDURE — 81001 URINALYSIS AUTO W/SCOPE: CPT

## 2022-08-19 PROCEDURE — 74011250636 HC RX REV CODE- 250/636: Performed by: EMERGENCY MEDICINE

## 2022-08-19 PROCEDURE — 82550 ASSAY OF CK (CPK): CPT

## 2022-08-19 PROCEDURE — 99284 EMERGENCY DEPT VISIT MOD MDM: CPT

## 2022-08-19 PROCEDURE — 85025 COMPLETE CBC W/AUTO DIFF WBC: CPT

## 2022-08-19 PROCEDURE — 96374 THER/PROPH/DIAG INJ IV PUSH: CPT

## 2022-08-19 PROCEDURE — 80053 COMPREHEN METABOLIC PANEL: CPT

## 2022-08-19 PROCEDURE — 36415 COLL VENOUS BLD VENIPUNCTURE: CPT

## 2022-08-19 RX ORDER — METHYLPREDNISOLONE 4 MG/1
TABLET ORAL
Qty: 1 DOSE PACK | Refills: 0 | Status: SHIPPED | OUTPATIENT
Start: 2022-08-19

## 2022-08-19 RX ORDER — KETOROLAC TROMETHAMINE 30 MG/ML
15 INJECTION, SOLUTION INTRAMUSCULAR; INTRAVENOUS
Status: COMPLETED | OUTPATIENT
Start: 2022-08-19 | End: 2022-08-19

## 2022-08-19 RX ADMIN — KETOROLAC TROMETHAMINE 15 MG: 30 INJECTION, SOLUTION INTRAMUSCULAR; INTRAVENOUS at 14:31

## 2022-08-19 NOTE — ED PROVIDER NOTES
HPI   58-year-old man with a past medical history of asthma, diverticulitis, gout, hypertension, and kidney stones who presents to the emergency department due to bilateral thigh pain. Patient says for the last week he has had pain in his bilateral groins that radiate down into his thighs. Initially started on the left but then began happening on the right. His pain is made worse when he attempts to sit up or stand from a seated position. He describes the pain is dull and achy in nature. He denies any urinary symptoms or abdominal pain. No nausea or vomiting. He does state he has chronic back pain and his back has been somewhat more sore recently. No chest pain or shortness of breath. No fevers or chills. No numbness or weakness in his legs. No loss of bladder or bowel control. No saddle anesthesia.   Past Medical History:   Diagnosis Date    Anxiety     Arthritis     back /gout    Asthma     Pt denies any sx currently 12/2/15    Chronic pain     Rt knee; has had cortisone shots    Diverticulitis     diverticulosis, pancreatitis    GERD (gastroesophageal reflux disease)     Gout     Right and left toes:  on preventative Rx so denies any problems    History of kidney stones     x1    Hypertension     MRSA (methicillin resistant staph aureus) culture positive 9/8/15    nares, treated with ABX    Psychiatric disorder     anxiety       Past Surgical History:   Procedure Laterality Date    HX CERVICAL FUSION  9/22/15    C4-7    HX COLONOSCOPY      HX ORTHOPAEDIC      right knee arthroscopy and cyst removed         Family History:   Problem Relation Age of Onset    Cancer Mother         CA COLON    Hypertension Mother     Hypertension Father     Kidney Disease Father        Social History     Socioeconomic History    Marital status: SINGLE     Spouse name: Not on file    Number of children: 1    Years of education: Not on file    Highest education level: Not on file   Occupational History    Occupation: unemployed   Tobacco Use    Smoking status: Former     Packs/day: 0.50     Years: 32.00     Pack years: 16.00     Types: Cigarettes     Quit date: 2009     Years since quittin.7     Passive exposure: Past    Smokeless tobacco: Never    Tobacco comments:     Quit 2 yrs ago   Vaping Use    Vaping Use: Never used   Substance and Sexual Activity    Alcohol use: No     Alcohol/week: 0.0 standard drinks    Drug use: Yes     Types: Marijuana, Opiates    Sexual activity: Yes     Partners: Female   Other Topics Concern    Not on file   Social History Narrative    Not on file     Social Determinants of Health     Financial Resource Strain: Not on file   Food Insecurity: Not on file   Transportation Needs: Not on file   Physical Activity: Not on file   Stress: Not on file   Social Connections: Not on file   Intimate Partner Violence: Not on file   Housing Stability: Not on file         ALLERGIES: Patient has no known allergies. Review of Systems  A complete review of systems was performed and all systems reviewed are negative unless otherwise documented in the HPI    Vitals:    22 1353   BP: 120/77   Pulse: 67   Resp: 16   Temp: 98.5 °F (36.9 °C)   SpO2: 93%   Weight: 117.5 kg (259 lb 0.7 oz)            Physical Exam  Constitutional:       Comments: Resting comfortably no acute distress   HENT:      Mouth/Throat:      Comments: Moist mucous membranes  Eyes:      General: No scleral icterus. Extraocular Movements: Extraocular movements intact. Neck:      Comments: Trachea midline  Cardiovascular:      Comments: Regular rate and rhythm without murmurs. 2+ DP pulses bilaterally  Pulmonary:      Effort: Pulmonary effort is normal. No respiratory distress. Breath sounds: Normal breath sounds. No wheezing or rales. Abdominal:      General: There is no distension. Palpations: Abdomen is soft. Tenderness: There is no abdominal tenderness.       Comments: No CVA tenderness bilaterally Musculoskeletal:      Cervical back: Normal range of motion. Comments: No asymmetric swelling in the thighs. No inguinal hernias noted. No reproducible tenderness with palpation of the bilateral inner thighs. Mild tenderness throughout the lower back   Skin:     General: Skin is warm and dry. Neurological:      Comments: Awake and alert. Speech is normal.  GCS 15        MDM  50-year-old man who presents with the above chief complaint. He has stable vital signs. His lower extremities look normal on exam with no swelling. There are no focal masses or focal areas of tenderness. He has tenderness throughout his lower back. No CVA tenderness noted. His pain does seem somewhat radicular in nature but the location of the bilateral inner thighs is somewhat interesting. He does not have any weakness on exam to suspect cauda equina or acute spinal cord compression. He later told me that he was having pain just over his bladder as well. I am going to obtain basic labs as well as a urinalysis and he will be given some Toradol. Labs all look good. Urinalysis does not look grossly infected. He is feeling better after Toradol. He was deemed appropriate for discharge. Patient will be referred to his primary care doctor as well as [address] [place] [address]. He was discharged in stable condition and is understanding of return precautions.        Procedures

## 2022-08-19 NOTE — DISCHARGE INSTRUCTIONS
Return to the ER with any new or worsening symptoms, loss of bladder or bowel control, numbness or weakness in your legs, fever, or any other symptoms you find concerning. Please take the steroids as prescribed and follow-up with your primary care doctor as well as [address] [place] [address].

## 2022-08-19 NOTE — ED NOTES
Pt discharged in stable condition at this time. MD/NO reviewed discharge instructions, prescriptions, and follow up with patient at bedside. Pt verbalized understanding and denies any needs or questions at this time.     Pt NAD and ambulatory to drive himself home

## 2022-08-19 NOTE — ED TRIAGE NOTES
Patient arrives with c/o left groin/leg pain for the last week +, and now the pain is radiating to his right groin. Reports pain has gotten worse. Denies injury. States taking tylenol with little relief.

## 2022-09-02 ENCOUNTER — APPOINTMENT (OUTPATIENT)
Dept: CT IMAGING | Age: 57
End: 2022-09-02
Attending: EMERGENCY MEDICINE
Payer: COMMERCIAL

## 2022-09-02 ENCOUNTER — HOSPITAL ENCOUNTER (EMERGENCY)
Age: 57
Discharge: HOME | End: 2022-09-03
Attending: EMERGENCY MEDICINE
Payer: COMMERCIAL

## 2022-09-02 DIAGNOSIS — K52.9 ENTERITIS: Primary | ICD-10-CM

## 2022-09-02 DIAGNOSIS — R11.2 NAUSEA AND VOMITING, UNSPECIFIED VOMITING TYPE: ICD-10-CM

## 2022-09-02 LAB
COMMENT, HOLDF: NORMAL
SAMPLES BEING HELD,HOLD: NORMAL

## 2022-09-02 PROCEDURE — 80053 COMPREHEN METABOLIC PANEL: CPT

## 2022-09-02 PROCEDURE — 36415 COLL VENOUS BLD VENIPUNCTURE: CPT

## 2022-09-02 PROCEDURE — 83690 ASSAY OF LIPASE: CPT

## 2022-09-02 PROCEDURE — 85025 COMPLETE CBC W/AUTO DIFF WBC: CPT

## 2022-09-02 PROCEDURE — 74011250636 HC RX REV CODE- 250/636: Performed by: EMERGENCY MEDICINE

## 2022-09-02 PROCEDURE — 96375 TX/PRO/DX INJ NEW DRUG ADDON: CPT

## 2022-09-02 PROCEDURE — 74177 CT ABD & PELVIS W/CONTRAST: CPT

## 2022-09-02 PROCEDURE — 96376 TX/PRO/DX INJ SAME DRUG ADON: CPT

## 2022-09-02 PROCEDURE — 99285 EMERGENCY DEPT VISIT HI MDM: CPT

## 2022-09-02 PROCEDURE — 96374 THER/PROPH/DIAG INJ IV PUSH: CPT

## 2022-09-02 RX ORDER — MORPHINE SULFATE 4 MG/ML
4 INJECTION INTRAVENOUS ONCE
Status: COMPLETED | OUTPATIENT
Start: 2022-09-03 | End: 2022-09-02

## 2022-09-02 RX ORDER — ONDANSETRON 2 MG/ML
4 INJECTION INTRAMUSCULAR; INTRAVENOUS ONCE
Status: COMPLETED | OUTPATIENT
Start: 2022-09-03 | End: 2022-09-02

## 2022-09-02 RX ADMIN — SODIUM CHLORIDE 1000 ML: 9 INJECTION, SOLUTION INTRAVENOUS at 23:38

## 2022-09-02 RX ADMIN — ONDANSETRON 4 MG: 2 INJECTION INTRAMUSCULAR; INTRAVENOUS at 23:42

## 2022-09-02 RX ADMIN — MORPHINE SULFATE 4 MG: 4 INJECTION INTRAVENOUS at 23:44

## 2022-09-03 VITALS
WEIGHT: 258 LBS | BODY MASS INDEX: 34.19 KG/M2 | OXYGEN SATURATION: 97 % | RESPIRATION RATE: 12 BRPM | DIASTOLIC BLOOD PRESSURE: 77 MMHG | HEIGHT: 73 IN | SYSTOLIC BLOOD PRESSURE: 131 MMHG | HEART RATE: 66 BPM | TEMPERATURE: 98.3 F

## 2022-09-03 LAB
ALBUMIN SERPL-MCNC: 3.9 G/DL (ref 3.5–5)
ALBUMIN/GLOB SERPL: 1 {RATIO} (ref 1.1–2.2)
ALP SERPL-CCNC: 88 U/L (ref 45–117)
ALT SERPL-CCNC: 33 U/L (ref 12–78)
ANION GAP SERPL CALC-SCNC: 10 MMOL/L (ref 5–15)
AST SERPL-CCNC: 19 U/L (ref 15–37)
BASOPHILS # BLD: 0 K/UL (ref 0–0.1)
BASOPHILS NFR BLD: 0 % (ref 0–1)
BILIRUB SERPL-MCNC: 0.6 MG/DL (ref 0.2–1)
BUN SERPL-MCNC: 18 MG/DL (ref 6–20)
BUN/CREAT SERPL: 15 (ref 12–20)
CALCIUM SERPL-MCNC: 8.9 MG/DL (ref 8.5–10.1)
CHLORIDE SERPL-SCNC: 101 MMOL/L (ref 97–108)
CO2 SERPL-SCNC: 31 MMOL/L (ref 21–32)
CREAT SERPL-MCNC: 1.21 MG/DL (ref 0.7–1.3)
DIFFERENTIAL METHOD BLD: ABNORMAL
EOSINOPHIL # BLD: 0.3 K/UL (ref 0–0.4)
EOSINOPHIL NFR BLD: 1 % (ref 0–7)
ERYTHROCYTE [DISTWIDTH] IN BLOOD BY AUTOMATED COUNT: 13.9 % (ref 11.5–14.5)
GLOBULIN SER CALC-MCNC: 4 G/DL (ref 2–4)
GLUCOSE SERPL-MCNC: 122 MG/DL (ref 65–100)
HCT VFR BLD AUTO: 46.9 % (ref 36.6–50.3)
HGB BLD-MCNC: 15.9 G/DL (ref 12.1–17)
IMM GRANULOCYTES # BLD AUTO: 0.3 K/UL (ref 0–0.04)
IMM GRANULOCYTES NFR BLD AUTO: 1 % (ref 0–0.5)
LIPASE SERPL-CCNC: 117 U/L (ref 73–393)
LYMPHOCYTES # BLD: 3.3 K/UL (ref 0.8–3.5)
LYMPHOCYTES NFR BLD: 13 % (ref 12–49)
MCH RBC QN AUTO: 31.8 PG (ref 26–34)
MCHC RBC AUTO-ENTMCNC: 33.9 G/DL (ref 30–36.5)
MCV RBC AUTO: 93.8 FL (ref 80–99)
MONOCYTES # BLD: 2.8 K/UL (ref 0–1)
MONOCYTES NFR BLD: 11 % (ref 5–13)
NEUTS SEG # BLD: 18.4 K/UL (ref 1.8–8)
NEUTS SEG NFR BLD: 74 % (ref 32–75)
NRBC # BLD: 0 K/UL (ref 0–0.01)
NRBC BLD-RTO: 0 PER 100 WBC
PLATELET # BLD AUTO: 392 K/UL (ref 150–400)
PMV BLD AUTO: 9.9 FL (ref 8.9–12.9)
POTASSIUM SERPL-SCNC: 3.6 MMOL/L (ref 3.5–5.1)
PROT SERPL-MCNC: 7.9 G/DL (ref 6.4–8.2)
RBC # BLD AUTO: 5 M/UL (ref 4.1–5.7)
RBC MORPH BLD: ABNORMAL
SODIUM SERPL-SCNC: 142 MMOL/L (ref 136–145)
WBC # BLD AUTO: 25.1 K/UL (ref 4.1–11.1)

## 2022-09-03 PROCEDURE — 74011250636 HC RX REV CODE- 250/636: Performed by: EMERGENCY MEDICINE

## 2022-09-03 PROCEDURE — 96365 THER/PROPH/DIAG IV INF INIT: CPT

## 2022-09-03 PROCEDURE — 96376 TX/PRO/DX INJ SAME DRUG ADON: CPT

## 2022-09-03 PROCEDURE — 96375 TX/PRO/DX INJ NEW DRUG ADDON: CPT

## 2022-09-03 PROCEDURE — 74011000636 HC RX REV CODE- 636

## 2022-09-03 PROCEDURE — 99285 EMERGENCY DEPT VISIT HI MDM: CPT

## 2022-09-03 PROCEDURE — 74011000258 HC RX REV CODE- 258: Performed by: EMERGENCY MEDICINE

## 2022-09-03 RX ORDER — METRONIDAZOLE 500 MG/1
500 TABLET ORAL 2 TIMES DAILY
Qty: 20 TABLET | Refills: 0 | Status: SHIPPED | OUTPATIENT
Start: 2022-09-03 | End: 2022-09-13

## 2022-09-03 RX ORDER — PROCHLORPERAZINE EDISYLATE 5 MG/ML
10 INJECTION INTRAMUSCULAR; INTRAVENOUS ONCE
Status: COMPLETED | OUTPATIENT
Start: 2022-09-03 | End: 2022-09-03

## 2022-09-03 RX ORDER — ONDANSETRON 4 MG/1
4 TABLET, FILM COATED ORAL
Qty: 10 TABLET | Refills: 0 | Status: SHIPPED | OUTPATIENT
Start: 2022-09-03

## 2022-09-03 RX ORDER — CIPROFLOXACIN 500 MG/1
500 TABLET ORAL 2 TIMES DAILY
Qty: 20 TABLET | Refills: 0 | Status: SHIPPED | OUTPATIENT
Start: 2022-09-03 | End: 2022-09-13

## 2022-09-03 RX ORDER — MORPHINE SULFATE 4 MG/ML
4 INJECTION INTRAVENOUS ONCE
Status: COMPLETED | OUTPATIENT
Start: 2022-09-03 | End: 2022-09-03

## 2022-09-03 RX ORDER — ONDANSETRON 2 MG/ML
4 INJECTION INTRAMUSCULAR; INTRAVENOUS ONCE
Status: COMPLETED | OUTPATIENT
Start: 2022-09-03 | End: 2022-09-03

## 2022-09-03 RX ADMIN — PIPERACILLIN AND TAZOBACTAM 4.5 G: 4; .5 INJECTION, POWDER, LYOPHILIZED, FOR SOLUTION INTRAVENOUS at 01:24

## 2022-09-03 RX ADMIN — IOPAMIDOL 100 ML: 755 INJECTION, SOLUTION INTRAVENOUS at 00:20

## 2022-09-03 RX ADMIN — PROCHLORPERAZINE EDISYLATE 10 MG: 5 INJECTION INTRAMUSCULAR; INTRAVENOUS at 02:03

## 2022-09-03 RX ADMIN — ONDANSETRON 4 MG: 2 INJECTION INTRAMUSCULAR; INTRAVENOUS at 00:43

## 2022-09-03 RX ADMIN — MORPHINE SULFATE 4 MG: 4 INJECTION INTRAVENOUS at 00:42

## 2022-09-03 RX ADMIN — SODIUM CHLORIDE 1000 ML: 9 INJECTION, SOLUTION INTRAVENOUS at 02:04

## 2022-09-03 NOTE — ED TRIAGE NOTES
Patient reports sudden onset vomiting and ABD pain around 8pm. \"It feels like a cinder block\"  Patient is diaphoretic and had a vomiting episode in [facility] bathroom lasting about 1 min. No Diarrhea.

## 2022-09-03 NOTE — DISCHARGE INSTRUCTIONS
Please use zofran for nausea as needed and take the antibiotics prescribed. See your PCP next week. If symptoms return, please come back to the ER. Thank you.

## 2022-09-03 NOTE — ED PROVIDER NOTES
Patient is a 79-year-old male with history of anxiety, arthritis, asthma, chronic right knee pain, diverticulitis, GERD, gout, kidney stones, hypertension who presents with mid abdominal pain that started after dinner at 8 PM tonight. Patient reports his stomach feels bloated and he has nausea and vomiting. Says that his bloatedness feels like when he had diverticulitis, but symptoms are more intense. He says he feels a pressure in his entire abdomen. Says he had a few episodes of nausea vomiting at home, then again on arrival to the ED. Patient reports that he has had diverticulitis diagnosed at [facility] and he was discharged home on p.o. antibiotics. Denies any urinary symptoms. No change in bowel or bladder habits.        Past Medical History:   Diagnosis Date    Anxiety     Arthritis     back /gout    Asthma     Pt denies any sx currently 12/2/15    Chronic pain     Rt knee; has had cortisone shots    Diverticulitis     diverticulosis, pancreatitis    GERD (gastroesophageal reflux disease)     Gout     Right and left toes:  on preventative Rx so denies any problems    History of kidney stones     x1    Hypertension     MRSA (methicillin resistant staph aureus) culture positive 9/8/15    nares, treated with ABX    Psychiatric disorder     anxiety       Past Surgical History:   Procedure Laterality Date    HX CERVICAL FUSION  9/22/15    C4-7    HX COLONOSCOPY      HX ORTHOPAEDIC      right knee arthroscopy and cyst removed         Family History:   Problem Relation Age of Onset    Cancer Mother         CA COLON    Hypertension Mother     Hypertension Father     Kidney Disease Father        Social History     Socioeconomic History    Marital status: SINGLE     Spouse name: Not on file    Number of children: 1    Years of education: Not on file    Highest education level: Not on file   Occupational History    Occupation: unemployed   Tobacco Use    Smoking status: Former     Packs/day: 0.50     Years: 32.00     Pack years: 16.00     Types: Cigarettes     Quit date: 2009     Years since quittin.7     Passive exposure: Past    Smokeless tobacco: Never    Tobacco comments:     Quit 2 yrs ago   Vaping Use    Vaping Use: Never used   Substance and Sexual Activity    Alcohol use: No     Alcohol/week: 0.0 standard drinks    Drug use: Yes     Types: Marijuana, Opiates    Sexual activity: Yes     Partners: Female   Other Topics Concern    Not on file   Social History Narrative    Not on file     Social Determinants of Health     Financial Resource Strain: Not on file   Food Insecurity: Not on file   Transportation Needs: Not on file   Physical Activity: Not on file   Stress: Not on file   Social Connections: Not on file   Intimate Partner Violence: Not on file   Housing Stability: Not on file         ALLERGIES: Patient has no known allergies. Review of Systems   Constitutional:  Negative for chills and fever. HENT:  Negative for drooling and nosebleeds. Eyes:  Negative for pain and itching. Respiratory:  Negative for choking and stridor. Cardiovascular:  Negative for leg swelling. Gastrointestinal:  Positive for abdominal pain, nausea and vomiting. Negative for rectal pain. Endocrine: Negative for heat intolerance and polyphagia. Genitourinary:  Negative for enuresis and genital sores. Musculoskeletal:  Negative for arthralgias and joint swelling. Skin:  Negative for color change. Allergic/Immunologic: Negative for immunocompromised state. Neurological:  Negative for tremors and speech difficulty. Hematological:  Negative for adenopathy. Psychiatric/Behavioral:  Negative for dysphoric mood and sleep disturbance. Vitals:    22 2332   BP: 139/85   Pulse: 86   Resp: 20   Temp: 98.3 °F (36.8 °C)   SpO2: 97%   Weight: 117 kg (258 lb)   Height: 6' 1\" (1.854 m)            Physical Exam  Vitals and nursing note reviewed.    Constitutional:       General: He is in acute distress. Appearance: He is well-developed. He is ill-appearing. He is not toxic-appearing or diaphoretic. HENT:      Head: Normocephalic. Nose: Nose normal.   Eyes:      Conjunctiva/sclera: Conjunctivae normal.   Cardiovascular:      Rate and Rhythm: Normal rate and regular rhythm. Heart sounds: Normal heart sounds. Pulmonary:      Effort: Pulmonary effort is normal. No respiratory distress. Breath sounds: Normal breath sounds. Abdominal:      General: There is distension. Palpations: Abdomen is soft. Tenderness: There is abdominal tenderness in the periumbilical area. There is no guarding or rebound. Musculoskeletal:         General: No deformity. Normal range of motion. Cervical back: Normal range of motion and neck supple. Skin:     General: Skin is warm and dry. Neurological:      Mental Status: He is alert. Coordination: Coordination normal.   Psychiatric:         Behavior: Behavior normal.        [facility]    ED Course as of 09/03/22 0258   Sat Sep 03, 2022   0151 . Patient. He reports he still feels sick. Offered him admission for intractable nausea vomiting, enteritis. He reports he does not want to be admitted to the hospital, is agreeable with plan for another dose of antiemetic, and we will reassess. [AL]   0255 On reeval, pt harshal ginger ale. Offered admission but he says he feels better and would like to go home. Says if his vomiting recurs, and he is not able to keep his abx down, he will go to [facility] for admission. Unruly donnelly. [AL]      ED Course User Index  [AL] Raine Ignacio MD       Procedures    Patient's results have been reviewed with them. Patient and/or family have verbally conveyed their understanding and agreement of the patient's signs, symptoms, diagnosis, treatment and prognosis and additionally agree to follow up as recommended or return to the Emergency Room should their condition change prior to follow-up.   Discharge instructions have also been provided to the patient with some educational information regarding their diagnosis as well a list of reasons why they would want to return to the ER prior to their follow-up appointment should their condition change.